# Patient Record
Sex: FEMALE | Race: WHITE | NOT HISPANIC OR LATINO | Employment: FULL TIME | URBAN - METROPOLITAN AREA
[De-identification: names, ages, dates, MRNs, and addresses within clinical notes are randomized per-mention and may not be internally consistent; named-entity substitution may affect disease eponyms.]

---

## 2017-01-09 ENCOUNTER — GENERIC CONVERSION - ENCOUNTER (OUTPATIENT)
Dept: OTHER | Facility: OTHER | Age: 32
End: 2017-01-09

## 2017-01-25 ENCOUNTER — GENERIC CONVERSION - ENCOUNTER (OUTPATIENT)
Dept: OTHER | Facility: OTHER | Age: 32
End: 2017-01-25

## 2017-01-25 ENCOUNTER — ALLSCRIPTS OFFICE VISIT (OUTPATIENT)
Dept: OTHER | Facility: OTHER | Age: 32
End: 2017-01-25

## 2017-01-26 ENCOUNTER — TRANSCRIBE ORDERS (OUTPATIENT)
Dept: ADMINISTRATIVE | Facility: HOSPITAL | Age: 32
End: 2017-01-26

## 2017-02-01 ENCOUNTER — GENERIC CONVERSION - ENCOUNTER (OUTPATIENT)
Dept: OTHER | Facility: OTHER | Age: 32
End: 2017-02-01

## 2017-02-01 ENCOUNTER — ALLSCRIPTS OFFICE VISIT (OUTPATIENT)
Dept: PERINATAL CARE | Facility: CLINIC | Age: 32
End: 2017-02-01
Payer: COMMERCIAL

## 2017-02-01 PROCEDURE — 76816 OB US FOLLOW-UP PER FETUS: CPT | Performed by: OBSTETRICS & GYNECOLOGY

## 2017-02-08 ENCOUNTER — LAB REQUISITION (OUTPATIENT)
Dept: LAB | Facility: HOSPITAL | Age: 32
End: 2017-02-08
Payer: COMMERCIAL

## 2017-02-08 ENCOUNTER — GENERIC CONVERSION - ENCOUNTER (OUTPATIENT)
Dept: OTHER | Facility: OTHER | Age: 32
End: 2017-02-08

## 2017-02-08 DIAGNOSIS — Z34.90 ENCOUNTER FOR SUPERVISION OF NORMAL PREGNANCY: ICD-10-CM

## 2017-02-08 PROCEDURE — 87653 STREP B DNA AMP PROBE: CPT | Performed by: OBSTETRICS & GYNECOLOGY

## 2017-02-10 LAB — GP B STREP DNA SPEC QL NAA+PROBE: NORMAL

## 2017-02-13 ENCOUNTER — GENERIC CONVERSION - ENCOUNTER (OUTPATIENT)
Dept: OTHER | Facility: OTHER | Age: 32
End: 2017-02-13

## 2017-02-22 ENCOUNTER — ALLSCRIPTS OFFICE VISIT (OUTPATIENT)
Dept: OTHER | Facility: OTHER | Age: 32
End: 2017-02-22

## 2017-02-27 ENCOUNTER — ALLSCRIPTS OFFICE VISIT (OUTPATIENT)
Dept: OTHER | Facility: OTHER | Age: 32
End: 2017-02-27

## 2017-02-28 ENCOUNTER — GENERIC CONVERSION - ENCOUNTER (OUTPATIENT)
Dept: OTHER | Facility: OTHER | Age: 32
End: 2017-02-28

## 2017-03-02 ENCOUNTER — ALLSCRIPTS OFFICE VISIT (OUTPATIENT)
Dept: OTHER | Facility: OTHER | Age: 32
End: 2017-03-02

## 2017-03-06 ENCOUNTER — ALLSCRIPTS OFFICE VISIT (OUTPATIENT)
Dept: OTHER | Facility: OTHER | Age: 32
End: 2017-03-06

## 2017-03-09 ENCOUNTER — ALLSCRIPTS OFFICE VISIT (OUTPATIENT)
Dept: OTHER | Facility: OTHER | Age: 32
End: 2017-03-09

## 2017-03-11 ENCOUNTER — HOSPITAL ENCOUNTER (INPATIENT)
Facility: HOSPITAL | Age: 32
LOS: 4 days | Discharge: HOME | End: 2017-03-15
Attending: OBSTETRICS & GYNECOLOGY | Admitting: OBSTETRICS & GYNECOLOGY
Payer: COMMERCIAL

## 2017-03-11 DIAGNOSIS — Z3A.40 40 WEEKS GESTATION OF PREGNANCY: Primary | ICD-10-CM

## 2017-03-11 LAB
ABO GROUP BLD: NORMAL
BLD GP AB SCN SERPL QL: NEGATIVE
ERYTHROCYTE [DISTWIDTH] IN BLOOD BY AUTOMATED COUNT: 13.4 % (ref 11.6–15.1)
HCT VFR BLD AUTO: 37.9 % (ref 34.8–46.1)
HGB BLD-MCNC: 12.9 G/DL (ref 11.5–15.4)
MCH RBC QN AUTO: 31.2 PG (ref 26.8–34.3)
MCHC RBC AUTO-ENTMCNC: 34 G/DL (ref 31.4–37.4)
MCV RBC AUTO: 92 FL (ref 82–98)
PLATELET # BLD AUTO: 290 THOUSANDS/UL (ref 149–390)
PMV BLD AUTO: 10.3 FL (ref 8.9–12.7)
RBC # BLD AUTO: 4.13 MILLION/UL (ref 3.81–5.12)
RH BLD: POSITIVE
WBC # BLD AUTO: 9.57 THOUSAND/UL (ref 4.31–10.16)

## 2017-03-11 PROCEDURE — 86900 BLOOD TYPING SEROLOGIC ABO: CPT | Performed by: OBSTETRICS & GYNECOLOGY

## 2017-03-11 PROCEDURE — 85027 COMPLETE CBC AUTOMATED: CPT | Performed by: OBSTETRICS & GYNECOLOGY

## 2017-03-11 PROCEDURE — 86901 BLOOD TYPING SEROLOGIC RH(D): CPT | Performed by: OBSTETRICS & GYNECOLOGY

## 2017-03-11 PROCEDURE — 86592 SYPHILIS TEST NON-TREP QUAL: CPT | Performed by: OBSTETRICS & GYNECOLOGY

## 2017-03-11 PROCEDURE — 86850 RBC ANTIBODY SCREEN: CPT | Performed by: OBSTETRICS & GYNECOLOGY

## 2017-03-11 RX ORDER — SODIUM CHLORIDE, SODIUM LACTATE, POTASSIUM CHLORIDE, CALCIUM CHLORIDE 600; 310; 30; 20 MG/100ML; MG/100ML; MG/100ML; MG/100ML
125 INJECTION, SOLUTION INTRAVENOUS CONTINUOUS
Status: DISCONTINUED | OUTPATIENT
Start: 2017-03-11 | End: 2017-03-12

## 2017-03-11 RX ADMIN — MISOPROSTOL 25 MCG: 100 TABLET ORAL at 23:13

## 2017-03-11 RX ADMIN — SODIUM CHLORIDE, SODIUM LACTATE, POTASSIUM CHLORIDE, AND CALCIUM CHLORIDE 125 ML/HR: .6; .31; .03; .02 INJECTION, SOLUTION INTRAVENOUS at 22:00

## 2017-03-12 ENCOUNTER — ANESTHESIA (INPATIENT)
Dept: LABOR AND DELIVERY | Facility: HOSPITAL | Age: 32
End: 2017-03-12
Payer: COMMERCIAL

## 2017-03-12 ENCOUNTER — ANESTHESIA EVENT (INPATIENT)
Dept: LABOR AND DELIVERY | Facility: HOSPITAL | Age: 32
End: 2017-03-12
Payer: COMMERCIAL

## 2017-03-12 PROBLEM — Z98.891 S/P CESAREAN SECTION: Status: ACTIVE | Noted: 2017-03-12

## 2017-03-12 LAB
BASE EXCESS BLDCOA CALC-SCNC: -3.9 MMOL/L (ref 3–11)
HCO3 BLDCOA-SCNC: 22.7 MMOL/L (ref 17.3–27.3)
O2 CT VFR BLDCOA CALC: 5.1 ML/DL
OXYHGB MFR BLDCOA: 21.1 %
PCO2 BLDCOA: 46.6 MM[HG] (ref 30–60)
PH BLDCOA: 7.31 [PH] (ref 7.23–7.43)
PO2 BLDCOA: 12.6 MM HG (ref 5–25)

## 2017-03-12 PROCEDURE — 82805 BLOOD GASES W/O2 SATURATION: CPT | Performed by: OBSTETRICS & GYNECOLOGY

## 2017-03-12 PROCEDURE — 94762 N-INVAS EAR/PLS OXIMTRY CONT: CPT | Performed by: SOCIAL WORKER

## 2017-03-12 RX ORDER — TERBUTALINE SULFATE 1 MG/ML
INJECTION, SOLUTION SUBCUTANEOUS
Status: COMPLETED
Start: 2017-03-12 | End: 2017-03-12

## 2017-03-12 RX ORDER — IBUPROFEN 600 MG/1
600 TABLET ORAL EVERY 6 HOURS PRN
Status: DISCONTINUED | OUTPATIENT
Start: 2017-03-12 | End: 2017-03-15 | Stop reason: HOSPADM

## 2017-03-12 RX ORDER — MIDAZOLAM HYDROCHLORIDE 1 MG/ML
INJECTION INTRAMUSCULAR; INTRAVENOUS AS NEEDED
Status: DISCONTINUED | OUTPATIENT
Start: 2017-03-12 | End: 2017-03-12 | Stop reason: SURG

## 2017-03-12 RX ORDER — BUPIVACAINE HYDROCHLORIDE 7.5 MG/ML
INJECTION, SOLUTION INTRASPINAL AS NEEDED
Status: DISCONTINUED | OUTPATIENT
Start: 2017-03-12 | End: 2017-03-12 | Stop reason: SURG

## 2017-03-12 RX ORDER — DIPHENHYDRAMINE HYDROCHLORIDE 50 MG/ML
25 INJECTION INTRAMUSCULAR; INTRAVENOUS EVERY 6 HOURS PRN
Status: DISPENSED | OUTPATIENT
Start: 2017-03-12 | End: 2017-03-13

## 2017-03-12 RX ORDER — KETOROLAC TROMETHAMINE 30 MG/ML
INJECTION, SOLUTION INTRAMUSCULAR; INTRAVENOUS AS NEEDED
Status: DISCONTINUED | OUTPATIENT
Start: 2017-03-12 | End: 2017-03-12 | Stop reason: SURG

## 2017-03-12 RX ORDER — DIAPER,BRIEF,INFANT-TODD,DISP
1 EACH MISCELLANEOUS DAILY PRN
Status: DISCONTINUED | OUTPATIENT
Start: 2017-03-12 | End: 2017-03-15 | Stop reason: HOSPADM

## 2017-03-12 RX ORDER — OXYTOCIN 10 [USP'U]/ML
INJECTION, SOLUTION INTRAMUSCULAR; INTRAVENOUS AS NEEDED
Status: DISCONTINUED | OUTPATIENT
Start: 2017-03-12 | End: 2017-03-12 | Stop reason: SURG

## 2017-03-12 RX ORDER — KETOROLAC TROMETHAMINE 30 MG/ML
30 INJECTION, SOLUTION INTRAMUSCULAR; INTRAVENOUS EVERY 6 HOURS
Status: COMPLETED | OUTPATIENT
Start: 2017-03-12 | End: 2017-03-13

## 2017-03-12 RX ORDER — NALBUPHINE HCL 10 MG/ML
5 AMPUL (ML) INJECTION
Status: ACTIVE | OUTPATIENT
Start: 2017-03-12 | End: 2017-03-13

## 2017-03-12 RX ORDER — METOCLOPRAMIDE HYDROCHLORIDE 5 MG/ML
5 INJECTION INTRAMUSCULAR; INTRAVENOUS EVERY 6 HOURS PRN
Status: ACTIVE | OUTPATIENT
Start: 2017-03-12 | End: 2017-03-13

## 2017-03-12 RX ORDER — SENNOSIDES 8.6 MG
1 TABLET ORAL
Status: DISCONTINUED | OUTPATIENT
Start: 2017-03-12 | End: 2017-03-15 | Stop reason: HOSPADM

## 2017-03-12 RX ORDER — OXYCODONE HYDROCHLORIDE AND ACETAMINOPHEN 5; 325 MG/1; MG/1
1 TABLET ORAL EVERY 4 HOURS PRN
Status: DISCONTINUED | OUTPATIENT
Start: 2017-03-13 | End: 2017-03-15 | Stop reason: HOSPADM

## 2017-03-12 RX ORDER — ONDANSETRON 2 MG/ML
4 INJECTION INTRAMUSCULAR; INTRAVENOUS EVERY 4 HOURS PRN
Status: DISPENSED | OUTPATIENT
Start: 2017-03-12 | End: 2017-03-13

## 2017-03-12 RX ORDER — MORPHINE SULFATE 0.5 MG/ML
INJECTION, SOLUTION EPIDURAL; INTRATHECAL; INTRAVENOUS AS NEEDED
Status: DISCONTINUED | OUTPATIENT
Start: 2017-03-12 | End: 2017-03-12 | Stop reason: SURG

## 2017-03-12 RX ORDER — TERBUTALINE SULFATE 1 MG/ML
0.25 INJECTION, SOLUTION SUBCUTANEOUS ONCE
Status: COMPLETED | OUTPATIENT
Start: 2017-03-12 | End: 2017-03-12

## 2017-03-12 RX ORDER — ONDANSETRON 2 MG/ML
INJECTION INTRAMUSCULAR; INTRAVENOUS AS NEEDED
Status: DISCONTINUED | OUTPATIENT
Start: 2017-03-12 | End: 2017-03-12 | Stop reason: SURG

## 2017-03-12 RX ORDER — OXYTOCIN/RINGER'S LACTATE 30/500 ML
1-30 PLASTIC BAG, INJECTION (ML) INTRAVENOUS ONCE
Status: COMPLETED | OUTPATIENT
Start: 2017-03-12 | End: 2017-03-12

## 2017-03-12 RX ORDER — ACETAMINOPHEN 325 MG/1
650 TABLET ORAL EVERY 6 HOURS PRN
Status: DISCONTINUED | OUTPATIENT
Start: 2017-03-12 | End: 2017-03-15 | Stop reason: HOSPADM

## 2017-03-12 RX ORDER — OXYCODONE HYDROCHLORIDE AND ACETAMINOPHEN 5; 325 MG/1; MG/1
2 TABLET ORAL EVERY 4 HOURS PRN
Status: DISCONTINUED | OUTPATIENT
Start: 2017-03-13 | End: 2017-03-15 | Stop reason: HOSPADM

## 2017-03-12 RX ORDER — ESMOLOL HYDROCHLORIDE 10 MG/ML
INJECTION INTRAVENOUS AS NEEDED
Status: DISCONTINUED | OUTPATIENT
Start: 2017-03-12 | End: 2017-03-12 | Stop reason: SURG

## 2017-03-12 RX ORDER — DOCUSATE SODIUM 100 MG/1
100 CAPSULE, LIQUID FILLED ORAL 2 TIMES DAILY
Status: DISCONTINUED | OUTPATIENT
Start: 2017-03-12 | End: 2017-03-15 | Stop reason: HOSPADM

## 2017-03-12 RX ORDER — SODIUM CHLORIDE, SODIUM LACTATE, POTASSIUM CHLORIDE, CALCIUM CHLORIDE 600; 310; 30; 20 MG/100ML; MG/100ML; MG/100ML; MG/100ML
INJECTION, SOLUTION INTRAVENOUS CONTINUOUS PRN
Status: DISCONTINUED | OUTPATIENT
Start: 2017-03-12 | End: 2017-03-12 | Stop reason: SURG

## 2017-03-12 RX ORDER — OXYCODONE HYDROCHLORIDE AND ACETAMINOPHEN 5; 325 MG/1; MG/1
1 TABLET ORAL EVERY 4 HOURS PRN
Status: DISCONTINUED | OUTPATIENT
Start: 2017-03-12 | End: 2017-03-15 | Stop reason: HOSPADM

## 2017-03-12 RX ORDER — EPHEDRINE SULFATE 50 MG/ML
INJECTION, SOLUTION INTRAVENOUS AS NEEDED
Status: DISCONTINUED | OUTPATIENT
Start: 2017-03-12 | End: 2017-03-12 | Stop reason: SURG

## 2017-03-12 RX ADMIN — ONDANSETRON 4 MG: 2 INJECTION INTRAMUSCULAR; INTRAVENOUS at 18:40

## 2017-03-12 RX ADMIN — MIDAZOLAM HYDROCHLORIDE 1 MG: 1 INJECTION, SOLUTION INTRAMUSCULAR; INTRAVENOUS at 12:35

## 2017-03-12 RX ADMIN — SODIUM CHLORIDE, SODIUM LACTATE, POTASSIUM CHLORIDE, AND CALCIUM CHLORIDE 125 ML/HR: .6; .31; .03; .02 INJECTION, SOLUTION INTRAVENOUS at 08:40

## 2017-03-12 RX ADMIN — EPHEDRINE SULFATE 10 MG: 50 INJECTION, SOLUTION INTRAMUSCULAR; INTRAVENOUS; SUBCUTANEOUS at 12:25

## 2017-03-12 RX ADMIN — KETOROLAC TROMETHAMINE 30 MG: 30 INJECTION, SOLUTION INTRAMUSCULAR at 12:25

## 2017-03-12 RX ADMIN — KETOROLAC TROMETHAMINE 30 MG: 30 INJECTION, SOLUTION INTRAMUSCULAR at 18:35

## 2017-03-12 RX ADMIN — TERBUTALINE SULFATE 0.25 MG: 1 INJECTION SUBCUTANEOUS at 05:51

## 2017-03-12 RX ADMIN — MIDAZOLAM HYDROCHLORIDE 1 MG: 1 INJECTION, SOLUTION INTRAMUSCULAR; INTRAVENOUS at 12:30

## 2017-03-12 RX ADMIN — ONDANSETRON 4 MG: 2 INJECTION INTRAMUSCULAR; INTRAVENOUS at 12:25

## 2017-03-12 RX ADMIN — TERBUTALINE SULFATE 0.25 MG: 1 INJECTION, SOLUTION SUBCUTANEOUS at 05:51

## 2017-03-12 RX ADMIN — MORPHINE SULFATE 0.25 MG: 0.5 INJECTION, SOLUTION EPIDURAL; INTRATHECAL; INTRAVENOUS at 11:58

## 2017-03-12 RX ADMIN — BUPIVACAINE HYDROCHLORIDE IN DEXTROSE 1.6 ML: 7.5 INJECTION, SOLUTION SUBARACHNOID at 11:58

## 2017-03-12 RX ADMIN — SODIUM CHLORIDE, SODIUM LACTATE, POTASSIUM CHLORIDE, AND CALCIUM CHLORIDE 125 ML/HR: .6; .31; .03; .02 INJECTION, SOLUTION INTRAVENOUS at 17:21

## 2017-03-12 RX ADMIN — SODIUM CHLORIDE, SODIUM LACTATE, POTASSIUM CHLORIDE, AND CALCIUM CHLORIDE 125 ML/HR: .6; .31; .03; .02 INJECTION, SOLUTION INTRAVENOUS at 04:27

## 2017-03-12 RX ADMIN — SODIUM CHLORIDE, SODIUM LACTATE, POTASSIUM CHLORIDE, AND CALCIUM CHLORIDE 125 ML/HR: .6; .31; .03; .02 INJECTION, SOLUTION INTRAVENOUS at 13:21

## 2017-03-12 RX ADMIN — Medication 2 MILLI-UNITS/MIN: at 04:26

## 2017-03-12 RX ADMIN — OXYTOCIN 20 UNITS: 10 INJECTION, SOLUTION INTRAMUSCULAR; INTRAVENOUS at 12:20

## 2017-03-12 RX ADMIN — ESMOLOL HYDROCHLORIDE 10 MG: 100 INJECTION, SOLUTION INTRAVENOUS at 12:08

## 2017-03-12 RX ADMIN — EPHEDRINE SULFATE 5 MG: 50 INJECTION, SOLUTION INTRAMUSCULAR; INTRAVENOUS; SUBCUTANEOUS at 11:59

## 2017-03-12 RX ADMIN — SODIUM CHLORIDE, SODIUM LACTATE, POTASSIUM CHLORIDE, AND CALCIUM CHLORIDE: .6; .31; .03; .02 INJECTION, SOLUTION INTRAVENOUS at 11:48

## 2017-03-12 RX ADMIN — CEFAZOLIN SODIUM 2000 MG: 2 SOLUTION INTRAVENOUS at 12:02

## 2017-03-12 RX ADMIN — DIPHENHYDRAMINE HYDROCHLORIDE 25 MG: 50 INJECTION, SOLUTION INTRAMUSCULAR; INTRAVENOUS at 18:43

## 2017-03-13 LAB
BASOPHILS # BLD AUTO: 0.01 THOUSANDS/ΜL (ref 0–0.1)
BASOPHILS NFR BLD AUTO: 0 % (ref 0–1)
EOSINOPHIL # BLD AUTO: 0.08 THOUSAND/ΜL (ref 0–0.61)
EOSINOPHIL NFR BLD AUTO: 1 % (ref 0–6)
ERYTHROCYTE [DISTWIDTH] IN BLOOD BY AUTOMATED COUNT: 13.9 % (ref 11.6–15.1)
HCT VFR BLD AUTO: 25.4 % (ref 34.8–46.1)
HGB BLD-MCNC: 8.2 G/DL (ref 11.5–15.4)
LYMPHOCYTES # BLD AUTO: 2.03 THOUSANDS/ΜL (ref 0.6–4.47)
LYMPHOCYTES NFR BLD AUTO: 17 % (ref 14–44)
MCH RBC QN AUTO: 30.4 PG (ref 26.8–34.3)
MCHC RBC AUTO-ENTMCNC: 32.3 G/DL (ref 31.4–37.4)
MCV RBC AUTO: 94 FL (ref 82–98)
MONOCYTES # BLD AUTO: 0.95 THOUSAND/ΜL (ref 0.17–1.22)
MONOCYTES NFR BLD AUTO: 8 % (ref 4–12)
NEUTROPHILS # BLD AUTO: 8.88 THOUSANDS/ΜL (ref 1.85–7.62)
NEUTS SEG NFR BLD AUTO: 74 % (ref 43–75)
NRBC BLD AUTO-RTO: 0 /100 WBCS
PLATELET # BLD AUTO: 190 THOUSANDS/UL (ref 149–390)
PMV BLD AUTO: 10.7 FL (ref 8.9–12.7)
RBC # BLD AUTO: 2.7 MILLION/UL (ref 3.81–5.12)
RPR SER QL: NORMAL
WBC # BLD AUTO: 11.98 THOUSAND/UL (ref 4.31–10.16)

## 2017-03-13 PROCEDURE — 85025 COMPLETE CBC W/AUTO DIFF WBC: CPT | Performed by: OBSTETRICS & GYNECOLOGY

## 2017-03-13 RX ADMIN — OXYCODONE HYDROCHLORIDE AND ACETAMINOPHEN 1 TABLET: 5; 325 TABLET ORAL at 21:30

## 2017-03-13 RX ADMIN — KETOROLAC TROMETHAMINE 30 MG: 30 INJECTION, SOLUTION INTRAMUSCULAR at 07:43

## 2017-03-13 RX ADMIN — DOCUSATE SODIUM 100 MG: 100 CAPSULE, LIQUID FILLED ORAL at 09:31

## 2017-03-13 RX ADMIN — DOCUSATE SODIUM 100 MG: 100 CAPSULE, LIQUID FILLED ORAL at 00:59

## 2017-03-13 RX ADMIN — OXYCODONE HYDROCHLORIDE AND ACETAMINOPHEN 1 TABLET: 5; 325 TABLET ORAL at 09:31

## 2017-03-13 RX ADMIN — SODIUM CHLORIDE, SODIUM LACTATE, POTASSIUM CHLORIDE, AND CALCIUM CHLORIDE 125 ML/HR: .6; .31; .03; .02 INJECTION, SOLUTION INTRAVENOUS at 01:09

## 2017-03-13 RX ADMIN — OXYCODONE HYDROCHLORIDE AND ACETAMINOPHEN 1 TABLET: 5; 325 TABLET ORAL at 14:52

## 2017-03-13 RX ADMIN — IBUPROFEN 600 MG: 600 TABLET, FILM COATED ORAL at 12:43

## 2017-03-13 RX ADMIN — DOCUSATE SODIUM 100 MG: 100 CAPSULE, LIQUID FILLED ORAL at 18:03

## 2017-03-13 RX ADMIN — KETOROLAC TROMETHAMINE 30 MG: 30 INJECTION, SOLUTION INTRAMUSCULAR at 00:59

## 2017-03-13 RX ADMIN — IBUPROFEN 600 MG: 600 TABLET, FILM COATED ORAL at 12:42

## 2017-03-14 RX ORDER — OXYCODONE HYDROCHLORIDE AND ACETAMINOPHEN 5; 325 MG/1; MG/1
1 TABLET ORAL EVERY 4 HOURS PRN
Qty: 30 TABLET | Refills: 0 | Status: SHIPPED | OUTPATIENT
Start: 2017-03-14 | End: 2018-03-27 | Stop reason: HOSPADM

## 2017-03-14 RX ORDER — SIMETHICONE 80 MG
80 TABLET,CHEWABLE ORAL EVERY 6 HOURS PRN
Status: DISCONTINUED | OUTPATIENT
Start: 2017-03-14 | End: 2017-03-15 | Stop reason: HOSPADM

## 2017-03-14 RX ADMIN — IBUPROFEN 600 MG: 600 TABLET, FILM COATED ORAL at 00:08

## 2017-03-14 RX ADMIN — OXYCODONE HYDROCHLORIDE AND ACETAMINOPHEN 1 TABLET: 5; 325 TABLET ORAL at 23:52

## 2017-03-14 RX ADMIN — IBUPROFEN 600 MG: 600 TABLET, FILM COATED ORAL at 20:31

## 2017-03-14 RX ADMIN — IBUPROFEN 600 MG: 600 TABLET, FILM COATED ORAL at 13:09

## 2017-03-14 RX ADMIN — DOCUSATE SODIUM 100 MG: 100 CAPSULE, LIQUID FILLED ORAL at 17:31

## 2017-03-14 RX ADMIN — SIMETHICONE CHEW TAB 80 MG 80 MG: 80 TABLET ORAL at 13:22

## 2017-03-14 RX ADMIN — OXYCODONE HYDROCHLORIDE AND ACETAMINOPHEN 1 TABLET: 5; 325 TABLET ORAL at 18:38

## 2017-03-14 RX ADMIN — OXYCODONE HYDROCHLORIDE AND ACETAMINOPHEN 1 TABLET: 5; 325 TABLET ORAL at 08:07

## 2017-03-14 RX ADMIN — DOCUSATE SODIUM 100 MG: 100 CAPSULE, LIQUID FILLED ORAL at 08:07

## 2017-03-14 RX ADMIN — OXYCODONE HYDROCHLORIDE AND ACETAMINOPHEN 1 TABLET: 5; 325 TABLET ORAL at 14:29

## 2017-03-15 VITALS
WEIGHT: 146 LBS | TEMPERATURE: 98.4 F | OXYGEN SATURATION: 99 % | DIASTOLIC BLOOD PRESSURE: 76 MMHG | HEART RATE: 72 BPM | HEIGHT: 61 IN | RESPIRATION RATE: 20 BRPM | BODY MASS INDEX: 27.56 KG/M2 | SYSTOLIC BLOOD PRESSURE: 114 MMHG

## 2017-03-15 RX ORDER — IBUPROFEN 600 MG/1
600 TABLET ORAL EVERY 6 HOURS PRN
Qty: 30 TABLET | Refills: 0 | Status: SHIPPED | OUTPATIENT
Start: 2017-03-15 | End: 2018-03-27 | Stop reason: HOSPADM

## 2017-03-15 RX ORDER — DOCUSATE SODIUM 100 MG/1
100 CAPSULE, LIQUID FILLED ORAL 2 TIMES DAILY
Qty: 60 CAPSULE | Refills: 0 | Status: SHIPPED | OUTPATIENT
Start: 2017-03-15 | End: 2018-03-27 | Stop reason: HOSPADM

## 2017-03-15 RX ORDER — OXYCODONE HYDROCHLORIDE AND ACETAMINOPHEN 5; 325 MG/1; MG/1
1 TABLET ORAL EVERY 4 HOURS PRN
Qty: 28 TABLET | Refills: 0 | Status: SHIPPED | OUTPATIENT
Start: 2017-03-15 | End: 2017-03-25

## 2017-03-15 RX ADMIN — OXYCODONE HYDROCHLORIDE AND ACETAMINOPHEN 1 TABLET: 5; 325 TABLET ORAL at 06:22

## 2017-03-15 RX ADMIN — OXYCODONE HYDROCHLORIDE AND ACETAMINOPHEN 1 TABLET: 5; 325 TABLET ORAL at 10:47

## 2017-03-15 RX ADMIN — OXYCODONE HYDROCHLORIDE AND ACETAMINOPHEN 1 TABLET: 5; 325 TABLET ORAL at 15:43

## 2017-03-15 RX ADMIN — DOCUSATE SODIUM 100 MG: 100 CAPSULE, LIQUID FILLED ORAL at 08:06

## 2017-03-22 ENCOUNTER — GENERIC CONVERSION - ENCOUNTER (OUTPATIENT)
Dept: OTHER | Facility: OTHER | Age: 32
End: 2017-03-22

## 2017-03-22 LAB — PLACENTA IN STORAGE: NORMAL

## 2017-03-24 ENCOUNTER — ALLSCRIPTS OFFICE VISIT (OUTPATIENT)
Dept: OTHER | Facility: OTHER | Age: 32
End: 2017-03-24

## 2017-03-29 ENCOUNTER — GENERIC CONVERSION - ENCOUNTER (OUTPATIENT)
Dept: OTHER | Facility: OTHER | Age: 32
End: 2017-03-29

## 2017-04-06 ENCOUNTER — ALLSCRIPTS OFFICE VISIT (OUTPATIENT)
Dept: OTHER | Facility: OTHER | Age: 32
End: 2017-04-06

## 2017-04-08 ENCOUNTER — TELEPHONE (OUTPATIENT)
Dept: LABOR AND DELIVERY | Facility: HOSPITAL | Age: 32
End: 2017-04-08

## 2017-04-14 ENCOUNTER — ALLSCRIPTS OFFICE VISIT (OUTPATIENT)
Dept: OTHER | Facility: OTHER | Age: 32
End: 2017-04-14

## 2017-04-17 ENCOUNTER — TELEPHONE (OUTPATIENT)
Dept: LABOR AND DELIVERY | Facility: HOSPITAL | Age: 32
End: 2017-04-17

## 2017-04-21 ENCOUNTER — ALLSCRIPTS OFFICE VISIT (OUTPATIENT)
Dept: OTHER | Facility: OTHER | Age: 32
End: 2017-04-21

## 2017-05-22 ENCOUNTER — ALLSCRIPTS OFFICE VISIT (OUTPATIENT)
Dept: OTHER | Facility: OTHER | Age: 32
End: 2017-05-22

## 2017-08-31 ENCOUNTER — GENERIC CONVERSION - ENCOUNTER (OUTPATIENT)
Dept: OTHER | Facility: OTHER | Age: 32
End: 2017-08-31

## 2017-10-12 ENCOUNTER — GENERIC CONVERSION - ENCOUNTER (OUTPATIENT)
Dept: OTHER | Facility: OTHER | Age: 32
End: 2017-10-12

## 2018-01-09 NOTE — RESULT NOTES
Verified Results  US OB < 14 WEEKS SINGLE OR FIRST GESTATION [id number] 08:45AM Jeannette Clock   TW Order Number: [id number]    - Patient Instructions: To schedule this appointment, please contact Central Scheduling at [phone]  Test Name Result Flag Reference   US OB < 14 WEEKS SINGLE OR FIRST GESTATION (Report)     FIRST TRIMESTER OBSTETRIC ULTRASOUND, COMPLETE     INDICATION: Encounter for supervision of normal pregnancy, unspecified, unspecified trimester  LMP is 5/30/2016  Dates and viability  COMPARISON: None     TECHNIQUE:  Transabdominal ultrasound of the pelvis was performed  Additional transvaginal imaging was then performed to better assess the gestation, myometrial/endometrial architecture and ovarian parenchymal detail  The study includes volumetric    sweeps and traditional still imaging technique  FINDINGS:     A single live intrauterine gestation is identified  Cardiac activity is present  Heart rate of 165 bpm      YOLK SAC: Present and normal in size and appearance  MEAN GESTATIONAL SAC SIZE: 33 mm = 8 weeks 2 days    MEAN CROWN RUMP LENGTH: 20 5 mm = 8 weeks 5 days    FETAL ANATOMY: Appropriate for gestational age  AMNIOTIC FLUID/SAC SHAPE: Within expected normal range  PLACENTA: The placenta is approriate for gestational age  There is no significant subchorionic fluid collection  UTERUS/ADNEXA:    5 0 x 3 6 x 3 8 cm exophytic right fundal fibroid  Probable right ovarian corpus luteum  Left ovary not visualized  The cervix remains closed  No free fluid present  IMPRESSION:     Single live intrauterine gestation at 8 weeks 5 days (range +/- 0 weeks 5 days) based on crown-rump length  ANNITA of 3/4/2017  Follow-up ultrasound at 20 weeks gestation may be considered for assessment of anatomy  5 cm exophytic right fundal fibroid  Nonvisualization left ovary         Workstation performed: [id number]     Signed by:   Steph Ballard DO 7/29/16       Signatures   Electronically signed by : IRIS Capps ; Jul 29 2016 12:24PM EST                       (Author)

## 2018-01-11 NOTE — RESULT NOTES
Message   Please call patient with reassuring results  Thank you  Verified Results  (Q) STEPWISE, PART 1 [id number] 08:35AM Unique Kaye     Test Name Result Flag Reference   INTERPRETATION SEE NOTE     This patient's risk does not exceed the first trimester  cut-off for Down syndrome or trisomy 18  The integrated  screen calculation is awaiting the second trimester sample  NT WAS USED IN THE RISK CALCULATIONS  Thank you for submitting this patient's Part 1 specimen  These first trimester values will be incorporated with the  second trimester values as part of the integrated testing  process  Please submit the Part 2 specimen between   09/10/2016-11/04/2016 (15 0 and 22 9 weeks gestation) with   09/10/2016-09/23/2016 (15 0 - 16 9 weeks gestation) being  optimal  When submitting Part 2, please include the  following Specimen # from Part 1:  [id number]   AGE RISK DOWN SYNDROME 1:410     RIC DOWN SYNDROME RISK <1:5000  <1:50   RISK FOR TRISOMY 18 <1:5000  <1:100   CALCULATED GESTATIONAL$AGE 13 4     Crown rump length (CRL) was used to calculate gestational  age  ANNITA, if provided, was not used for gestational age  dating  RADHA-A 1133 4 ng/mL     This test was performed using a kit that has not been  cleared or approved by the FDA  The analytical performance  characteristics of this test have been determined by [address]  This test  should not be used for diagnosis without confirmation by  other medically established means  RADHA-A MOM 0 96     HCG 62 9 IU/mL     HCG MOM 0 70     NT MOM 0 79     The maternal serum screening results indicate a lower risk  of trisomy 21 in this pregnancy  The nasal bone was assessed  via ultrasound and was present  The combined risk is  therefore likely to be less than the calculated risk  Other  findings later in the pregnancy may change the risk    Nasal bone assessment is best accomplished through a fetal  ultrasound performed between 11 weeks 0 days through 13  weeks 6 days  In assessing the risk for aneuploidy, the  evaluation of the maternal serum markers plus the nuchal  thickness measurement is calculated first  Any potential  change to the patient's risk for aneuploidy depends on the  nuchal thickness, crown-rump length, and the ethnic origin,  and therefore the values generated by the algorithm itself  will not change  Additional information about the assessment  of the fetal nasal bone may be found on the [facility] website at  http://www  fetalmedicine com/fmf/training-certification/cert  ilmugycn-dm-nkflq  nereyda/11-13-week-scan/assessment-of-the-nasal-bone/  Please note that the Sequential Integrated maternal serum  screen for Down syndrome risk assessment was designed by Dr Chriss Guadarrama ([address], et al J Med Screen 2003 v10 p56-104)  to provide a high detection rate and low false positive rate  when a cutoff of 1:50 is used to identify high risk  pregnancies during the first trimester  Use of any other  cutoff for determination of risk in the first trimester will  result in a higher false positive rate for the two-part  screen  All patients whose risk is lower than 1:50 should  have a second sample submitted to complete the screen  This is a screening test, not a diagnostic test      This risk assessment is based on demographic data provided  by the ordering physician  Please notify the laboratory  promptly if any data are incorrect  If you have questions concerning this report: For clinical consultation, call [phone]; For technical questions, call [phone] ext [phone]; For recalculations, fax to [phone]  For additional information, please refer to  http://education  [employer]*/faq/FAQ85  (This link is provided for informational/educational  purposes only )   REFERRING PHYSICIAN NAME [address] [place] [address] PHONE [phone]     REFERRING PHYSICIAN MAGALY [id number]     DATE OF BIRTH 1985     COLLECTION DATE 08/30/2016     MATERNAL WEIGHT 118 lbs     EST'D DATE OF DELIVERY 03/06/2017     ANNITA DETERMINED BY LMP     MOTHER'S ETHNIC ORIGIN      NUMBER OF FETUSES 1     INSULIN-DEPEND DIABETIC NO     REPEAT SPECIMEN NO     HX OF NEURAL TUBE DEFECTS NO     HISTORY OF DOWN SYNDROME NO     DONOR EGG NO     Donor Age: Egg Retrieval NOT GIVEN     ULTRASOUND DATE 08/26/2016     ULTRASONOGRAPHER'S NAME Cj Galvan     NTQR ULTRASONOGRAPHER ID# [id number]     NTQR LOCATION ID# NOT GIVEN     NTQR READING PHYS ID# [id number]     [facility] ULTRASONOGRAPHER ID# NOT GIVEN     CROWN RUMP LENGTH 68 mm     NUCHAL TRANSLUCENCY 1 2 mm     Nasal Bone PRESENT     IF TWINS NOT GIVEN     TWIN B CRL NOT GIVEN mm     TWIN B NT NOT GIVEN mm     Twin B Nasal Bone NOT GIVEN

## 2018-01-12 NOTE — MISCELLANEOUS
Message   Recorded as Task   Date: 10/10/2017 04:21 PM, Created By: Bryce Doyle   Task Name: Medical Complaint Callback   Assigned To: Alma Delia Morin   Regarding Patient: Shaji Merino, Status: Active   CommentLuwashekhar Mantel - 10 Oct 2017 4:21 PM     TASK CREATED  Caller: Self; Medical Complaint; [phone] (Home)  Started back on BC pills (ortho tri-cyclen lo) in June  This has happened in the past while she was on them, but for the past two months, the first and second day of her pack she gets so nauseated that she throws up  She takes the pill with breakfast and is fine  Starts to get nauseated throughout the day, eats some crackers and by 1-2:00 she throws up  Is there something she can do? Should she change the pill? Please call  [address] [place] [address] 10 Oct 2017 5:33 PM     TASK REPLIED TO: Previously Assigned To Alma Delia Morin  she can try taking the pill at night or we can try a different pill  Haider Fernández - 12 Oct 2017 9:38 AM     TASK EDITED  Lara is going to take her bc at night starting next month cause she only gets sick the first few days and then she is fine for the rest of the month          Signatures   Electronically signed by : Galilea Tiwari DO; Oct 12 7428 10:40AM EST                       (Author)

## 2018-01-13 VITALS
HEIGHT: 61 IN | SYSTOLIC BLOOD PRESSURE: 130 MMHG | WEIGHT: 133.25 LBS | BODY MASS INDEX: 25.16 KG/M2 | DIASTOLIC BLOOD PRESSURE: 86 MMHG

## 2018-01-13 VITALS
WEIGHT: 123 LBS | DIASTOLIC BLOOD PRESSURE: 78 MMHG | SYSTOLIC BLOOD PRESSURE: 122 MMHG | BODY MASS INDEX: 23.22 KG/M2 | HEART RATE: 68 BPM | HEIGHT: 61 IN

## 2018-01-13 VITALS
DIASTOLIC BLOOD PRESSURE: 66 MMHG | SYSTOLIC BLOOD PRESSURE: 110 MMHG | HEIGHT: 61 IN | BODY MASS INDEX: 23.13 KG/M2 | WEIGHT: 122.5 LBS | HEART RATE: 73 BPM

## 2018-01-13 VITALS
HEART RATE: 87 BPM | SYSTOLIC BLOOD PRESSURE: 136 MMHG | HEIGHT: 61 IN | DIASTOLIC BLOOD PRESSURE: 82 MMHG | WEIGHT: 148 LBS | BODY MASS INDEX: 27.94 KG/M2

## 2018-01-13 VITALS
HEART RATE: 91 BPM | SYSTOLIC BLOOD PRESSURE: 124 MMHG | HEIGHT: 61 IN | BODY MASS INDEX: 27.56 KG/M2 | DIASTOLIC BLOOD PRESSURE: 94 MMHG | WEIGHT: 146 LBS

## 2018-01-13 VITALS
WEIGHT: 142.6 LBS | BODY MASS INDEX: 26.92 KG/M2 | SYSTOLIC BLOOD PRESSURE: 124 MMHG | DIASTOLIC BLOOD PRESSURE: 78 MMHG | HEIGHT: 61 IN

## 2018-01-13 NOTE — PROGRESS NOTES
Assessment    1  Encounter for supervision of normal pregnancy (V22 1) (Z34 90)   2  Polyhydramnios (657 00) (O40 9XX0)   3  Uterine fibroids affecting pregnancy (654 10) (O34 10,D25 9)    Plan  Encounter for supervision of normal pregnancy, Polyhydramnios, Uterine fibroids  affecting pregnancy    · Fetal Non Stress Test - POC; Status:Complete;   Done: 77PSZ0509    Discussion/Summary  Discussion Summary:   FOR IOL Saturday night into Sunday  Chief Complaint  Chief Complaint Free Text Note Form: NST today      Active Problems    1  Encounter for supervision of normal pregnancy (V22 1) (Z34 90)   2  Polyhydramnios (657 00) (O40 9XX0)   3  Uterine fibroids affecting pregnancy (654 10) (O34 10,D25 9)    Allergies    1  Compazine SOLN    2  No Known Environmental Allergies   3  No Known Food Allergies    Vitals  Signs   Recorded: 24XZY2699 01:07PM   Heart Rate: 71  Systolic: 963  Diastolic: 68  Height: 5 ft 1 in  Weight: 147 lb   BMI Calculated: 27 78  BSA Calculated: 1 66  Pain Scale: 0    Procedure    G/P 1,0   LMP 05/30/16   EDC 03/06/17   EGA 40 3     /68   Duration of Test 25 minutes  Result: Reactive and > 15 bpm with movement  Baseline Rate 120 bpm    Deceleration: None  Fetal kick counts were reviewed with the patient  Current Meds   1  Breast Pump Miscellaneous; double electric; Therapy: 55KVN5001 to (Last Rx:34Xch3331) Ordered   2  L-Lysine 500 MG Oral Tablet; Therapy: (Recorded:60Olc4830) to Recorded   3  Original Prenatal Formula TABS;    Therapy: (Recorded:87Owl3683) to Recorded    Signatures   Electronically signed by : Gayathri Driver DO; Mar  9 0115  1:43PM EST                       (Author)

## 2018-01-14 VITALS
WEIGHT: 121 LBS | DIASTOLIC BLOOD PRESSURE: 72 MMHG | BODY MASS INDEX: 22.84 KG/M2 | HEART RATE: 71 BPM | SYSTOLIC BLOOD PRESSURE: 110 MMHG | HEIGHT: 61 IN

## 2018-01-14 VITALS
WEIGHT: 125 LBS | DIASTOLIC BLOOD PRESSURE: 72 MMHG | BODY MASS INDEX: 23.6 KG/M2 | SYSTOLIC BLOOD PRESSURE: 116 MMHG | HEART RATE: 68 BPM | HEIGHT: 61 IN

## 2018-01-14 VITALS
SYSTOLIC BLOOD PRESSURE: 112 MMHG | HEIGHT: 61 IN | BODY MASS INDEX: 27.38 KG/M2 | DIASTOLIC BLOOD PRESSURE: 66 MMHG | HEART RATE: 71 BPM | WEIGHT: 145 LBS

## 2018-01-15 NOTE — MISCELLANEOUS
To Whom It May Concern:        Gilma Nice is under my care for her pregnancy  Due to her late stage in pregnancy, I am putting her out of work after Friday, February 24th  Please contact my office with any questions  Sincerely,         Rosita SIMMONS  [address], DO      Electronically signed by:Rosita Ye DO  Feb 22 3108  9:13AM EST      Electronically signed Gina CAMPOS    Feb 22 2017 12:21PM EST Acknowledgement

## 2018-01-16 NOTE — PROGRESS NOTES
OCT 14 2016         RE: Emily Benitez                            To: [phone] [address] [place] [address] [address]   [facility]   MR#: [id number]                                  [facility]   : [address]   ENC: [id number]                             Maurizio Kmi, [facility]   (Exam #: [id number])                           Fax: [phone]      The LMP of this 27year old,  G1, P0-0-0-0 patient was MAY 27 2016, giving   her an ANNITA of MAR 6 2017 and a current gestational age of 24 weeks 4 days   by dates  A sonographic examination was performed on OCT 14 2016 using   real time equipment  The ultrasound examination was performed using   abdominal & vaginal techniques  The patient has a BMI of 23 6  Her blood   pressure today was 115/72  Earliest ultrasound found in her record: 16 8w2d 3/7/17 ANNITA      Problem list:   1   Pedunculated Fibroid 4 8 x 5 7 x 3 6 cm was noted on her 12 week scan      Cardiac motion was observed at 134 bpm       INDICATIONS      fetal anatomical survey   uterine fibroids      Exam Types      LEVEL II   Transvaginal      RESULTS      Fetus # 1 of 1   Vertex presentation   Fetal growth appeared normal   Placenta Location = Anterior   No placenta previa   Placenta Grade = 0      MEASUREMENTS (* Included In Average GA)      AC              14 4 cm        19 weeks 3 days* (48%)   BPD              4 7 cm        20 weeks 1 day * (65%)   HC              17 2 cm        19 weeks 5 days* (51%)   Femur            3 1 cm        19 weeks 6 days* (45%)      Nuchal Fold      3 7 mm      Humerus          2 9 cm        19 weeks 4 days  (52%)   Radius           2 4 cm        19 weeks 6 days   Ulna             2 9 cm        20 weeks 4 days   Tibia            2 7 cm        19 weeks 5 days  (46%)   Fibula           2 8 cm        19 weeks 3 days   Foot             3 0 cm        19 weeks 1 day      Cerebellum       2 1 cm        20 weeks 4 days   Biorbit          3 0 cm        19 weeks 4 days   CisternaMagna    3 6 mm      HC/AC           1 20   FL/AC           0 22   FL/BPD          0 67   EFW (Ac/Fl/Hc)   306 grams - 0 lbs 11 oz      THE AVERAGE GESTATIONAL AGE is 19 weeks 6 days +/- 10 days  AMNIOTIC FLUID         Largest Vertical Pocket = 4 8 cm   Amniotic Fluid: Normal      CERVICAL EVALUATION      SUPINE      Cervical Length: 3 80 cm      OTHER TEST RESULTS           Funneling?: No             Dynamic Changes?: No        Resp  To TFP?: No      ANATOMY      Head                                    Normal   Face/Neck                               Normal   Th  Cav  Normal   Heart                                   Normal   Abd  Cav  Normal   Stomach                                 Normal   Right Kidney                            Normal   Left Kidney                             Normal   Bladder                                 Normal   Abd  Wall                               Normal   Spine                                   Normal   Extrems                                 Normal   Genitalia                               Normal   Placenta                                Normal   Umbl  Cord                              Normal   Uterus                                  Normal   PCI                                     Normal      ANATOMY DETAILS      Visualized Appearing Sonographically Normal:   HEAD: (Calvarium, BPD Level, Cavum, Lateral Ventricles, Choroid Plexus,   Cerebellum, Cisterna Magna);    FACE/NECK: (Neck, Nuchal Fold, Profile,   Orbits, Nose/Lips, Palate, Face);    TH  CAV  : (Lungs, Diaphragm);       HEART: (Four Chamber View, Proximal Left Outflow, Proximal Right Outflow,   3 Vessel Trachea, Short Axis of Greater Vessels, Ductal Arch, Aortic Arch,   Interventricular Septum, Interatrial Septum, IVC, SVC, Cardiac Axis,   Cardiac Position);    ABD  CAV : (Liver, Gall Bladder); STOMACH, RIGHT   KIDNEY, LEFT KIDNEY, BLADDER, ABD  WALL, SPINE: (Cervical Spine, Thoracic   Spine, Lumbar Spine, Sacrum);    EXTREMS: (Lt Humerus, Rt Humerus, Lt   Forearm, Rt Forearm, Lt Hand, Rt Hand, Lt Femur, Rt Femur, Lt Low Leg, Rt   Low Leg, Lt Foot, Rt Foot);    GENITALIA, PLACENTA, UMBL  CORD, UTERUS, PCI      ANATOMY COMMENTS      No fetal structural abnormality or ultrasound marker for aneuploidy is   identified on the Level II ultrasound study today  Her survey of the   fetal anatomy is complete  Fetal growth and amniotic fluid volume appear   normal   Active movement of the fetal body & extremities was seen  There   is no suspicion of a subchorionic bleed  The placental cord insertion was   normal       Her right-sided pedunculated fibroid today measured 5 2 x 4 2 x 4 7 cm and   is similar in size to her prior scans and is nontender  FIBROIDS      Submucosal myometrium fibroid located in the right lateral fundus region,   measuring 5 3 x 4 2 x 4 8cm with a volume of 55 9cc  The appearance was   echolucent  ADNEXA      The left ovary appeared normal and measured 2 6 x 2 7 x 1 9 cm with a   volume of 7 0 cc  The right ovary appeared normal and measured 2 4 x 2 8 x   1 2 cm with a volume of 4 2 cc  IMPRESSION      Romano IUP   19 weeks and 6 days by this ultrasound  (ANNITA=MAR 4 2017)   Vertex presentation   Fetal growth appeared normal   Normal anatomy survey   Regular fetal heart rate of 134 bpm   Anterior placenta   No placenta previa      GENERAL COMMENT      The patient was informed of the findings and counseled about the   limitations of the exam in detecting all forms of fetal congenital   abnormalities  She denies any vaginal bleeding or uterine cramping/contractions  She does   feel fetal movement  Her sequential screen was normal with a less than   one in 5000 risk for Downs, trisomy 18 and neural tube defects        Follow up recommended:   No further follow-up is recommended at this time  The face to face time, in addition to time spent discussing ultrasound   results, was approximately  minutes, greater than 50% of which was spent   during counseling and coordination of care  BELLA Mcclure M D     Maternal-Fetal Medicine   Electronically signed 10/14/16 12:05           Electronically signed by:Rosita Gabriel DO  Oct 17 9361  9:58AM EST

## 2018-01-17 NOTE — PROGRESS NOTES
2017         RE: Mira Arambula                            To: [id number] [facility] [address]   [facility]   MR#: [id number]                                  [address]   : [address]   [address]:                                              [place], [facility]   (Exam #: [id number])                           Fax: [phone]      The LMP of this 27year old,  G1, P0-0-0-0 patient was MAY 27 2016, giving   her an ANNITA of MAR 6 2017 and a current gestational age of 27 weeks 2 days   by dates  A sonographic examination was performed on 2017 using real   time equipment  The ultrasound examination was performed using abdominal   technique  The patient has a BMI of 26 8  Her blood pressure today was   124/78  Earliest ultrasound found in her record: 16 8w2d 3/7/17 ANNITA         Cardiac motion was observed at 123 bpm       INDICATIONS      size less than dates      Exam Types      Level I      RESULTS      Fetus # 1 of 1   Vertex presentation   Fetal growth appeared normal   Placenta Location = Anterior   No placenta previa   Placenta Grade = II      MEASUREMENTS (* Included In Average GA)      AC              30 9 cm        35 weeks 0 days* (46%)   BPD              8 7 cm        35 weeks 1 day * (45%)   HC              31 0 cm        34 weeks 1 day * (19%)   Femur            6 4 cm        33 weeks 0 days* (18%)      Cerebellum       4 6 cm        35 weeks 6 days      HC/AC           1 00   FL/AC           0 21   FL/BPD          0 74   EFW (Ac/Fl/Hc)  2398 grams - 5 lbs 5 oz                 (28%)      THE AVERAGE GESTATIONAL AGE is 34 weeks 2 days +/- 21 days        AMNIOTIC FLUID      Q1: 5 4      Q2: 7 6      Q3: 3 6      Q4: 5 7   CLINT Total = 22 2 cm   Amniotic Fluid: Normal      ANATOMY DETAILS      Visualized Appearing Sonographically Normal:   HEAD: (Calvarium, BPD Level, Lateral Ventricles, Cerebellum);      FACE/NECK: (Nose/Lips);    TH  CAV : (Diaphragm); HEART: ([employer]);    STOMACH, RIGHT KIDNEY, LEFT KIDNEY, BLADDER, PLACENTA      Not Visualized:   HEAD: (Cavum); HEART: (Proximal Left Outflow, Proximal Right Outflow)      IMPRESSION      Romano IUP   34 weeks and 2 days by this ultrasound  (ANNITA=MAR 13 2017)   Vertex presentation   Fetal growth appeared normal   Regular fetal heart rate of 123 bpm   Anterior placenta   No placenta previa      GENERAL COMMENT      No fetal structural abnormality is identified on the Level I survey today  Fetal interval growth and amniotic fluid volume are normal  The   previously suspected uterine myoma is not imaged well today  The placenta   appears normal       RECOMMENDATION      Growth Ultrasound: As indicated      Blake Apgar, R D M S Laveda Pheasant, M D  Maternal-Fetal Medicine   Electronically signed 17 13:00           Electronically signed Alfonzo CAMPOS    2017 12:20PM EST Acknowledgement

## 2018-01-17 NOTE — RESULT NOTES
Message   Please call patient with reassuring results  Thank you  Verified Results  (Q) STEPWISE, PART 2 [id number] 08:53AM Brian Garcia     Test Name Result Flag Reference   INTERPRETATION SEE NOTE     SCREEN NEGATIVE FOR OPEN NTD, DOWN SYNDROME AND TRISOMY 18   NT WAS USED IN THE RISK CALCULATIONS  RISK FOR ONTD <1:5000     AGE RISK DOWN SYNDROME 1:540     RIC DOWN SYNDROME RISK <1:5000  <1:270   RISK FOR TRISOMY 18 <1:5000  <1:100   CALCULATED GESTATIONAL$AGE 16 4     Crown rump length (CRL) was used to calculate gestational  age  ANNITA, if provided, was not used for gestational age  dating  AFP, SERUM 40 0 ng/mL     AFP MOM 0 99     Reference Range:                                        <2 50                                        IDD        <1 90                                        TWINS      <4 00                                        TWINS IDD  <3 50                                        TRIPLETS   <4 50   HCG, SERUM 26 0 IU/mL     HCG MOM 0 70     ESTRIOL, FREE 0 80 ng/mL     ESTRIOL MOM 0 78     INHIBIN A, DIMERIC 143 pg/mL     INHIBIN A MOM 0 75     RADHA A 1133 4 ng/mL     This test was performed using a kit that has not been  cleared or approved by the FDA  The analytical performance  characteristics of this test have been determined by [facility]  This test  should not be used for diagnosis without confirmation by  other medically established means  RADHA-A MOM 0 97     NT MOM 0 79     The maternal serum screening results indicate a lower risk  of trisomy 21 in this pregnancy  The nasal bone was assessed  via ultrasound and was present  The combined risk is  therefore likely to be less than the calculated risk  Other  findings later in the pregnancy may change the risk  Nasal bone assessment is best accomplished through a fetal  ultrasound performed between 11 weeks 0 days through 13  weeks 6 days   In assessing the risk for aneuploidy, the  evaluation of the maternal serum markers plus the nuchal  thickness measurement is calculated first  Any potential  change to the patient's risk for aneuploidy depends on the  nuchal thickness, crown-rump length, and the ethnic origin,  and therefore the values generated by the algorithm itself  will not change  Additional information about the assessment  of the fetal nasal bone may be found on the [facility] website at  http://www  fetalmediRadarFind/f/training-certification/cert  rbfddnjy-rj-kmwzj  tence/11-13-week-scan/assessment-of-the-nasal-bone/  The Sequential Integrated Screen combines RADHA-A and hCG  with or without a nuchal translucency measurement in the  first trimester with AFP, unconjugated estriol, intact hCG  and Inhibin A in the second trimester  This provides a  useful screening test for detection of open neural tube  defects, Down syndrome and Trisomy 18  It should be noted  that normal results can never guarantee the birth of a  normal baby and that 2 to 3 percent of newborns have some  type of physical or mental defect, many of which are  undetectable through any known prenatal diagnostic  technique  This is a screening test, not a diagnostic test      This risk assessment is based on demographic data provided  by the ordering physician  Please notify the laboratory  promptly if any data are incorrect  If you have questions concerning this report: For clinical consultation, call [phone]; For technical questions, call [phone] ext [phone]; For recalculations, fax to [phone]     REFERRING PHYSICIAN NAME [facility] PHYSICIAN PHONE [phone]     REFERRING PHYSICIAN MAGALY [id number]     SPECIMEN # FROM PART 1 [id number]     DATE OF BIRTH 1985     COLLECTION DATE 09/20/2016     MATERNAL WEIGHT 119 lbs     ANNITA: 03/06/2017     NUCHAL TRANSLUCENCY 1 2 mm     Zoar Rump Length 68 mm     Ultrasound Date 08/26/2016     Nasal Bone PRESENT     MOTHER'S ETHNIC ORIGIN      INSULIN DEPEND DIABETIC NO     REPEAT SPECIMEN NO     NUMBER OF FETUSES 1     HX OF NEURAL TUBE DEFECTS NO     Twin B Nasal Bone NOT GIVEN     For additional information, please refer to  http://[employer]/faq/FAQ18  (This link is provided for more informational/educational  purposes only )

## 2018-01-17 NOTE — PROGRESS NOTES
AUG 26 2016         RE: Leatha Duffy                            To: [id number] [address] [place] [address] [address]   [facility]   MR#: [id number]                                  [facility]   : [address]   ENC: [id number]:JORJEUGIRIS ELIZONDO, [address] [facility]   (Exam #: [id number])                           Fax: [phone]      The LMP of this 31year old,  G1, P0-0-0-0 patient was MAY 27 2016, giving   her an ANNITA of MAR 6 2017 and a current gestational age of 16 weeks 4 days   by dates  A sonographic examination was performed on AUG 26 2016 using   real time equipment  The ultrasound examination was performed using   abdominal technique  The patient has a BMI of 22 3  Her blood pressure   today was 111/72  Earliest ultrasound found in her record: 16 8w2d 3/7/17 ANNITA      Thank you very much for referring this very nice patient for a    consultation and genetic screening ultrasound  This is the patient's   first pregnancy  The patient's medical history is essentially   unremarkable  Her gynecologic history is significant for a known fibroid   which has not caused her any pain  She denies the current use of tobacco,   cocaine, or drugs  She currently takes prenatal vitamins and L-lysine  She has an allergy to Compazine which causes anaphylaxis  Her family   medical history is significant for her mother having breast cancer at the   age of 52  A review of systems is otherwise unremarkable  On exam, the   patient appears well, in no acute distress, and her abdomen is nontender  Multiple longitudinal and transverse sections revealed a christy   intrauterine pregnancy with the fetus in variable presentation  The   placenta is anterior in implantation, grade 0 in appearance        Cardiac motion was observed at 153 bpm       INDICATIONS      first trimester genetic screening      Exam Types      Level I RESULTS      Fetus # 1 of 1   Variable presentation   Fetal growth appeared normal      MEASUREMENTS (* Included In Average GA)      CRL              6 8 cm        12 weeks 6 days*   Nuchal Trans    1 20 mm      THE AVERAGE GESTATIONAL AGE is 12 weeks 6 days +/- 7 days  FIBROIDS      Fibroid located in the right fundus region, measuring 4 8 x 5 7 x 3 6cm   with a volume of 51 5cc  Color doppler flow was not increased  The   appearance was heterogeneous  UTERINE ARTERIES                                  S/D   PI    RI    NOTCH       Left Uterine Artery        3 45  1 46  0 71       Right Uterine Artery       3 43  1 57  0 71      ANATOMY COMMENTS      Anatomic detail is limited at this gestational age  The yolk sac was not   noted  The fetal cranium appeared normal in shape and the nuchal   translucency was normal in size (1 2mm)  The nasal bone appears to be   present  The intracranial anatomy was unremarkable  Evaluation of the   spine revealed no obvious evidence for a neural tube defect  Anatomy of   the fetal thorax appeared within normal limits  The cardiac rhythm was   regular  Within the abdomen, stomach & bladder were visualized and the   abdominal wall appeared intact  A three vessel cord appears to be present  Active movement of the fetal body & extremities was seen  There is no   suspicion of a subchorionic bleed  The placental cord insertion was   normal    The uterine artery Dopplers are normall for this gestational   age  There is no suspicion of a uterine myoma  Free fluid is not seen in   the posterior cul-de-sac  ADNEXA      The left ovary was not visualized   The right ovary appeared normal and   measured 2 7 x 2 7 x 1 5 cm with a volume of 5 7 cc       AMNIOTIC FLUID         Largest Vertical Pocket = 3 3 cm   Amniotic Fluid: Normal      IMPRESSION      Romano IUP   12 weeks and 6 days by this ultrasound  (ANNITA=MAR 4 2017)   Variable presentation   Fetal growth appeared normal   Regular fetal heart rate of 153 bpm   Anterior placenta      GENERAL COMMENT      We discussed the options for genetic screening, including but not limited   to first trimester screening, second trimester screening, combined first   and second trimester screening, noninvasive prenatal testing (NIPT) for   patients at high risk and diagnostic screening through the use of CVS and   amniocentesis  We discussed the risks and benefits of each approach   including the sensitivities and false positive rates as well as the   difference between a screening test and a diagnostic test   At the   conclusion of our discussion the patient elected Stepwise Sequential   Screening to delineate her risk for fetal aneuploidy  She was given a   requisition to go to [facility] to have the first trimester bloodwood drawn  The first trimester portion of the screening results, encompassing age,   nuchal translucency, and biochemistry should be available within one week   of testing and will be reported from [facility]   The second stage of   sequential screening should be completed between the 15th and 21st week of   pregnancy (ideally between 16-18 weeks)  The patient has a fibroid which appears to be pedunculated  We discussed   that sometimes fibroids can grow during pregnancy, but most of them remain   the same and don't cause any significant issues with the pregnancy  The   most common complication includes maternal pain which can be treated   safely with non-steroidal anti-inflammatory medications up until around 32   weeks, if used sparingly  Usually, pedunculated fibroids do not cause any   significant complications with fetal development  Recommend an anatomy ultrasound be scheduled for 20 weeks gestation        Please note, in addition to the time spent discussing the results of the   ultrasound, I spent approximately 15 minutes of face-to-face time with the   patient, greater than 50% of which was spent in counseling and the   coordination of care for this patient  Thank you very much for allowing us to participate in the care of this   very nice patient  Should you have any questions, please do not hesitate   to contact our office  BELLA Benson M D  Electronically signed 08/26/16 08:39           Electronically signed Manuel CAMPOS    Aug 29 2016  9:55AM EST Acknowledgement

## 2018-01-22 VITALS
HEIGHT: 61 IN | SYSTOLIC BLOOD PRESSURE: 122 MMHG | DIASTOLIC BLOOD PRESSURE: 70 MMHG | HEART RATE: 91 BPM | OXYGEN SATURATION: 99 % | WEIGHT: 138.4 LBS | BODY MASS INDEX: 26.13 KG/M2

## 2018-01-22 VITALS
BODY MASS INDEX: 27.75 KG/M2 | SYSTOLIC BLOOD PRESSURE: 122 MMHG | DIASTOLIC BLOOD PRESSURE: 68 MMHG | WEIGHT: 147 LBS | HEART RATE: 71 BPM | HEIGHT: 61 IN

## 2018-01-22 VITALS
DIASTOLIC BLOOD PRESSURE: 78 MMHG | SYSTOLIC BLOOD PRESSURE: 124 MMHG | BODY MASS INDEX: 26.81 KG/M2 | HEIGHT: 61 IN | OXYGEN SATURATION: 98 % | HEART RATE: 98 BPM | WEIGHT: 142 LBS

## 2018-01-22 VITALS
HEART RATE: 72 BPM | BODY MASS INDEX: 27.19 KG/M2 | RESPIRATION RATE: 16 BRPM | DIASTOLIC BLOOD PRESSURE: 72 MMHG | WEIGHT: 144 LBS | HEIGHT: 61 IN | SYSTOLIC BLOOD PRESSURE: 112 MMHG

## 2018-01-22 VITALS
HEART RATE: 63 BPM | HEIGHT: 61 IN | SYSTOLIC BLOOD PRESSURE: 110 MMHG | DIASTOLIC BLOOD PRESSURE: 72 MMHG | BODY MASS INDEX: 22.66 KG/M2 | OXYGEN SATURATION: 99 % | WEIGHT: 120 LBS

## 2018-01-22 VITALS
SYSTOLIC BLOOD PRESSURE: 128 MMHG | BODY MASS INDEX: 27.19 KG/M2 | RESPIRATION RATE: 16 BRPM | WEIGHT: 144 LBS | HEIGHT: 61 IN | DIASTOLIC BLOOD PRESSURE: 62 MMHG | HEART RATE: 96 BPM

## 2018-01-22 VITALS
BODY MASS INDEX: 27.75 KG/M2 | DIASTOLIC BLOOD PRESSURE: 68 MMHG | WEIGHT: 147 LBS | SYSTOLIC BLOOD PRESSURE: 128 MMHG | HEART RATE: 102 BPM | HEIGHT: 61 IN

## 2018-03-07 NOTE — PROGRESS NOTES
[employer] Education 2nd Trimester:   Second Trimester Education provided: benefits of breastfeeding, importance of exclusive breastfeeding, early initiation of breastfeeding, exclusive breastfeeding for the first 6 months, non-pharmacologic pain relief methods for labor, rooming-in on 24 hour basis and Pregnancy Essentials Reference Guide given  Mother has not registered for prenatal class  Thought has not been given to selecting a pediatrician  The mother has not discussed personal preferences with her provider  Prenatal education provided by: mauri Date: 11/2/16        Signatures   Electronically signed by : Stann Hamman, DO; Nov 2 1474  9:28AM EST                       (Author)

## 2018-03-07 NOTE — PROGRESS NOTES
Education  [employer] Education 3rd Trimester: Third Trimester Education provided:   benefits of breastfeeding, importance of exclusive breastfeeding, early initiation of breastfeeding, exclusive breastfeeding for the first 6 months, frequent feedings for optimal milk production, feeding on demand/baby-led feedings, effective positioning and attachment, importance of breastfeeding after 6 months following introduction of other foods, non-pharmacologic pain relief methods for labor and importance of early skin-to-skin contact  rooming-in on 24 hour basis   The patient is planning on breastfeeding  The patient is planning on exclusively breastfeeding until the baby is 10months of age  Mother has registered for prenatal class  Thought has been given to selecting a pediatrician  The mother has discussed personal preferences with her provider     Prenatal education provided by: Amber Garcia   Electronically signed by : IRIS Hopkins ; Jan 25 2017  9:50AM EST                       (Author)

## 2018-03-27 DIAGNOSIS — Z30.41 ENCOUNTER FOR SURVEILLANCE OF CONTRACEPTIVE PILLS: Primary | ICD-10-CM

## 2018-03-27 RX ORDER — NORGESTIMATE AND ETHINYL ESTRADIOL
1 KIT DAILY
Qty: 84 TABLET | Refills: 1 | Status: SHIPPED | OUTPATIENT
Start: 2018-03-27 | End: 2018-09-06 | Stop reason: SDUPTHER

## 2018-03-27 RX ORDER — SERTRALINE HYDROCHLORIDE 25 MG/1
1 TABLET, FILM COATED ORAL DAILY
COMMUNITY
Start: 2017-04-06 | End: 2018-09-08 | Stop reason: SDUPTHER

## 2018-03-27 RX ORDER — NORGESTIMATE AND ETHINYL ESTRADIOL
1 KIT DAILY
COMMUNITY
Start: 2018-02-24 | End: 2018-03-27 | Stop reason: SDUPTHER

## 2018-04-25 ENCOUNTER — TELEPHONE (OUTPATIENT)
Dept: OBGYN CLINIC | Facility: CLINIC | Age: 33
End: 2018-04-25

## 2018-04-25 NOTE — TELEPHONE ENCOUNTER
Sent letter as requested electronically but probably needs  a signed copy  Please print and I will sign

## 2018-04-25 NOTE — TELEPHONE ENCOUNTER
Pt called, wants to know if you would call her in regards to cirtrillium that you prescribed her  Please callback at [phone]   Refused to speak to nurse at this time

## 2018-09-06 DIAGNOSIS — Z30.41 ENCOUNTER FOR SURVEILLANCE OF CONTRACEPTIVE PILLS: ICD-10-CM

## 2018-09-07 RX ORDER — NORGESTIMATE AND ETHINYL ESTRADIOL
1 KIT DAILY
Qty: 84 TABLET | Refills: 0 | Status: SHIPPED | OUTPATIENT
Start: 2018-09-07 | End: 2018-10-12 | Stop reason: SDUPTHER

## 2018-09-08 RX ORDER — SERTRALINE HYDROCHLORIDE 25 MG/1
25 TABLET, FILM COATED ORAL DAILY
Qty: 90 TABLET | Refills: 1 | Status: SHIPPED | OUTPATIENT
Start: 2018-09-08 | End: 2018-10-12 | Stop reason: SDUPTHER

## 2018-09-08 RX ORDER — SERTRALINE HYDROCHLORIDE 25 MG/1
TABLET, FILM COATED ORAL
Qty: 30 TABLET | Refills: 11 | OUTPATIENT
Start: 2018-09-08

## 2018-10-12 ENCOUNTER — ANNUAL EXAM (OUTPATIENT)
Dept: OBGYN CLINIC | Facility: CLINIC | Age: 33
End: 2018-10-12
Payer: COMMERCIAL

## 2018-10-12 VITALS
WEIGHT: 113 LBS | HEIGHT: 60 IN | DIASTOLIC BLOOD PRESSURE: 66 MMHG | BODY MASS INDEX: 22.19 KG/M2 | SYSTOLIC BLOOD PRESSURE: 118 MMHG

## 2018-10-12 DIAGNOSIS — Z01.419 WOMEN'S ANNUAL ROUTINE GYNECOLOGICAL EXAMINATION: Primary | ICD-10-CM

## 2018-10-12 DIAGNOSIS — Z30.41 ENCOUNTER FOR SURVEILLANCE OF CONTRACEPTIVE PILLS: ICD-10-CM

## 2018-10-12 DIAGNOSIS — R10.2 PELVIC PAIN: ICD-10-CM

## 2018-10-12 PROBLEM — Z3A.40 40 WEEKS GESTATION OF PREGNANCY: Status: RESOLVED | Noted: 2017-03-11 | Resolved: 2018-10-12

## 2018-10-12 PROBLEM — D25.9 UTERINE FIBROID: Status: ACTIVE | Noted: 2017-03-24

## 2018-10-12 PROBLEM — Z98.891 S/P CESAREAN SECTION: Status: RESOLVED | Noted: 2017-03-12 | Resolved: 2018-10-12

## 2018-10-12 PROCEDURE — 99395 PREV VISIT EST AGE 18-39: CPT | Performed by: OBSTETRICS & GYNECOLOGY

## 2018-10-12 RX ORDER — NORGESTIMATE AND ETHINYL ESTRADIOL 7DAYSX3 LO
1 KIT ORAL DAILY
Qty: 84 TABLET | Refills: 3 | Status: SHIPPED | OUTPATIENT
Start: 2018-10-12 | End: 2019-10-23 | Stop reason: SDUPTHER

## 2018-10-12 RX ORDER — SERTRALINE HYDROCHLORIDE 25 MG/1
25 TABLET, FILM COATED ORAL DAILY
Qty: 90 TABLET | Refills: 1 | Status: SHIPPED | OUTPATIENT
Start: 2018-10-12 | End: 2019-10-23 | Stop reason: SDUPTHER

## 2018-10-12 NOTE — PROGRESS NOTES
ASSESSMENT & PLAN: Enzo Nevarez is a 28 y o  Heather Hunger with normal gynecologic exam     1   Routine well woman exam done today  2    Pap and HPV:Pap with HPV was not done today  Current ASCCP Guidelines reviewed  3   The patient declined STD testing  No testing performed  Safe sex practices have been discussed  4  The patient is not sexually active  She agreeed to continue contraception and options have been discussed  5  The following were reviewed in today's visit: breast self exam, use and side effects of OCPs, family planning choices, exercise and healthy diet  6  Patient to return to office in 12 months for annual exam      All questions have been answered to her satisfaction  CC:  Annual Gynecologic Examination    HPI: Enzo Nevarez is a 28 y o  Heather Hunger who presents for annual gynecologic examination  She has the following concerns:  Divorce final  Needs refills on OCPs  Doing well on sertraline, considering weaning off  Would recommend every other day for a few weeks then can stop  H/o fibroid  Has occasional pain on right side, usually with/right before her period  Health Maintenance:    She exercises 1 days per week  She wears her seatbelt routinely  She does perform regular monthly self breast exams  She feels safe at home  Patients does follow a balanced diet  Past Medical History:   Diagnosis Date    Fibroid     Post partum depression 2017    Varicella     childhood       Past Surgical History:   Procedure Laterality Date     SECTION      [place]  DELIVERY ONLY N/A 3/12/2017    Procedure:  SECTION ();   Surgeon: [facility] DO JASSI;  [facility];  Service: Obstetrics       Past OB/Gyn History:  Period Cycle (Days): 28  Period Duration (Days): 5 to 6 days   Period Pattern: Regular  Menstrual Flow: Heavy, Moderate  Menstrual Control: Tampon, Panty liner  Dysmenorrhea: (!) Mild  Dysmenorrhea Symptoms: CrampingPatient's last menstrual period was 10/02/2018 (exact date)  History of sexually transmitted infection No  Patient is not currently sexually active: heterosexual  Birth control:oral contraceptives (estrogen/progesterone)    Last Pap  2016 :  no abnormalities;  HPV negative    Family History  Family History   Problem Relation Age of Onset    Breast cancer Mother     Breast cancer Maternal Aunt     No Known Problems Paternal Grandmother     No Known Problems Father     No Known Problems Sister     No Known Problems Son     No Known Problems Maternal Grandmother     No Known Problems Maternal Grandfather     No Known Problems Paternal Grandfather        Social History:  Social History     Social History    Marital status: /Civil Union     Spouse name: N/A    Number of children: N/A    Years of education: N/A     Occupational History    Not on file  Social History Main Topics    Smoking status: Never Smoker    Smokeless tobacco: Never Used    Alcohol use No    Drug use: No    Sexual activity: Yes     Partners: Male     Birth control/ protection: OCP     Other Topics Concern    Not on file     Social History Narrative    No narrative on file     Presently lives with mom and son  Patient is   Patient is currently employed  Allergies: Allergies   Allergen Reactions    Compazine [Prochlorperazine] Swelling     Tongue swelled, back spasm    Pollen Extract        Medications:    Current Outpatient Prescriptions:     sertraline (ZOLOFT) 25 mg tablet, Take 1 tablet (25 mg total) by mouth daily, Disp: 90 tablet, Rfl: 1    TRI-LO-VAISHALI 0 18/0 215/0 25 MG-25 MCG per tablet, TAKE 1 TABLET BY MOUTH DAILY, Disp: 84 tablet, Rfl: 0    Review of Systems:  Review of Systems   Constitutional: Negative for unexpected weight change  Respiratory: Negative for shortness of breath  Cardiovascular: Negative for chest pain     Gastrointestinal: Negative for abdominal pain, blood in stool, constipation, nausea and vomiting  Genitourinary: Positive for pelvic pain (pulling sensation with menses on right side)  Negative for difficulty urinating, dysuria, frequency, vaginal bleeding, vaginal discharge and vaginal pain  Neurological: Negative for headaches  Breasts: No changes    Physical Exam:  /66   Ht 5' 0 25" (1 53 m)   Wt 51 3 kg (113 lb)   LMP 10/02/2018 (Exact Date)   Breastfeeding? No   BMI 21 89 kg/m²    Physical Exam   Constitutional: She is oriented to person, place, and time  She appears well-developed and well-nourished  Genitourinary: Vagina normal and uterus normal  Pelvic exam was performed with patient supine  There is no rash, tenderness or lesion on the right labia  There is no rash, tenderness or lesion on the left labia  Vagina exhibits rugosity  No tenderness or bleeding in the vagina  No vaginal discharge found  Right adnexum does not display mass, does not display tenderness and does not display fullness  Left adnexum does not display mass, does not display tenderness and does not display fullness  Cervix does not exhibit motion tenderness, lesion, discharge or polyp  Uterus is mobile  Uterus is not tender  Neck: Normal range of motion  No thyromegaly present  Cardiovascular: Normal rate and regular rhythm  No murmur heard  Pulmonary/Chest: Effort normal and breath sounds normal  No respiratory distress  She has no wheezes  Right breast exhibits no inverted nipple, no mass, no nipple discharge, no skin change and no tenderness  Left breast exhibits no inverted nipple, no mass, no nipple discharge, no skin change and no tenderness  Abdominal: Soft  She exhibits no distension  There is no tenderness  There is no guarding  Neurological: She is alert and oriented to person, place, and time  Skin: Skin is dry  Psychiatric: She has a normal mood and affect   Her behavior is normal

## 2019-10-23 ENCOUNTER — ANNUAL EXAM (OUTPATIENT)
Dept: OBGYN CLINIC | Facility: CLINIC | Age: 34
End: 2019-10-23
Payer: COMMERCIAL

## 2019-10-23 VITALS
SYSTOLIC BLOOD PRESSURE: 102 MMHG | WEIGHT: 115 LBS | BODY MASS INDEX: 21.71 KG/M2 | DIASTOLIC BLOOD PRESSURE: 60 MMHG | HEIGHT: 61 IN

## 2019-10-23 DIAGNOSIS — Z01.419 WELL FEMALE EXAM WITH ROUTINE GYNECOLOGICAL EXAM: Primary | ICD-10-CM

## 2019-10-23 DIAGNOSIS — Z30.41 ENCOUNTER FOR SURVEILLANCE OF CONTRACEPTIVE PILLS: ICD-10-CM

## 2019-10-23 DIAGNOSIS — Z12.4 ENCOUNTER FOR SCREENING FOR MALIGNANT NEOPLASM OF CERVIX: ICD-10-CM

## 2019-10-23 DIAGNOSIS — Z91.89 AT HIGH RISK FOR BREAST CANCER: ICD-10-CM

## 2019-10-23 DIAGNOSIS — Z12.31 ENCOUNTER FOR SCREENING MAMMOGRAM FOR MALIGNANT NEOPLASM OF BREAST: ICD-10-CM

## 2019-10-23 PROCEDURE — 87624 HPV HI-RISK TYP POOLED RSLT: CPT | Performed by: OBSTETRICS & GYNECOLOGY

## 2019-10-23 PROCEDURE — S0612 ANNUAL GYNECOLOGICAL EXAMINA: HCPCS | Performed by: OBSTETRICS & GYNECOLOGY

## 2019-10-23 PROCEDURE — G0145 SCR C/V CYTO,THINLAYER,RESCR: HCPCS | Performed by: OBSTETRICS & GYNECOLOGY

## 2019-10-23 RX ORDER — NORGESTIMATE AND ETHINYL ESTRADIOL 7DAYSX3 LO
1 KIT ORAL DAILY
Qty: 84 TABLET | Refills: 3 | Status: SHIPPED | OUTPATIENT
Start: 2019-10-23 | End: 2020-09-29 | Stop reason: SDUPTHER

## 2019-10-23 RX ORDER — SERTRALINE HYDROCHLORIDE 25 MG/1
25 TABLET, FILM COATED ORAL DAILY
Qty: 90 TABLET | Refills: 3 | Status: SHIPPED | OUTPATIENT
Start: 2019-10-23

## 2019-10-23 NOTE — PROGRESS NOTES
ASSESSMENT & PLAN:     Diagnoses and all orders for this visit:    Well female exam with routine gynecological exam  Encounter for screening for malignant neoplasm of cervix  -     Liquid-based pap, screening    Encounter for surveillance of contraceptive pills  -     norgestimate-ethinyl estradiol (TRI-LO-VAISHALI) 0 18/0 215/0 25 MG-25 MCG per tablet; Take 1 tablet by mouth daily    Post partum depression  -     sertraline (ZOLOFT) 25 mg tablet; Take 1 tablet (25 mg total) by mouth daily  - Takes every other day    Encounter for screening mammogram for malignant neoplasm of breast  At high risk for breast cancer  -     Mammo screening bilateral w 3d & cad; Future  - Baseline  - Mom with breast CA at age 48      The following were reviewed in today's visit: ASCCP guidelines, breast self exam, use and side effects of OCPs, exercise and healthy diet  Patient to return to office in yearly for annual exam      All questions have been answered to her satisfaction  CC:  Annual Gynecologic Examination    HPI: Clinton Marin is a 27 y o  [place] who presents for annual gynecologic examination  She has the following concerns:  Two spots on bra at area of nipple  Can not extract discharge from breast  Almost looks a sweat stain  Health Maintenance:    She exercises 0 days per week  She wears her seatbelt routinely  She is practicing breast self awareness  She feels safe at home  Patient does try to follow a balanced diet  Past Medical History:   Diagnosis Date    Fibroid     Post partum depression 2017    Varicella     childhood       Past Surgical History:   Procedure Laterality Date     SECTION      [place]  DELIVERY ONLY N/A 3/12/2017    Procedure:  SECTION ();   Surgeon: Peyton Robert DO;  Location: [facility] ;  Service: Obstetrics       Past OB/Gyn History:  Period Cycle (Days): 28  Period Duration (Days): 5-6  Period Pattern: Regular  Menstrual Flow: Moderate, Light  Dysmenorrhea: (!) Mild  Dysmenorrhea Symptoms: CrampingPatient's last menstrual period was 10/02/2019  History of sexually transmitted infection: No  Patient is currently sexually active     Birth control:oral contraceptives (estrogen/progesterone)    Last Pap  2016 :  NILM;  HPV negative    Family History  Family History   Problem Relation Age of Onset    Breast cancer Mother     Breast cancer Maternal Aunt     No Known Problems Paternal Grandmother     No Known Problems Father     No Known Problems Sister     No Known Problems Son     No Known Problems Maternal Grandmother     No Known Problems Maternal Grandfather     No Known Problems Paternal Grandfather        Social History:  Social History     Socioeconomic History    Marital status: /Civil Union     Spouse name: Not on file    Number of children: Not on file    Years of education: Not on file    Highest education level: Not on file   Occupational History    Not on file   Social Needs    Financial resource strain: Not on file    Food insecurity:     Worry: Not on file     Inability: Not on file    Transportation needs:     Medical: Not on file     Non-medical: Not on file   Tobacco Use    Smoking status: Never Smoker    Smokeless tobacco: Never Used   Substance and Sexual Activity    Alcohol use: No    Drug use: No    Sexual activity: Yes     Partners: Male     Birth control/protection: OCP   Lifestyle    Physical activity:     Days per week: Not on file     Minutes per session: Not on file    Stress: Not on file   Relationships    Social connections:     Talks on phone: Not on file     Gets together: Not on file     Attends Mu-ism service: Not on file     Active member of club or organization: Not on file     Attends meetings of clubs or organizations: Not on file     Relationship status: Not on file    Intimate partner violence:     Fear of current or ex partner: Not on file     Emotionally abused: Not on file Physically abused: Not on file     Forced sexual activity: Not on file   Other Topics Concern    Not on file   Social History Narrative    Not on file     Presently lives with parents  Patient is , and dating someone  Patient is currently employed  Allergies: Allergies   Allergen Reactions    Compazine [Prochlorperazine] Swelling     Tongue swelled, back spasm    Pollen Extract        Medications:    Current Outpatient Medications:     norgestimate-ethinyl estradiol (TRI-LO-VAISHALI) 0 18/0 215/0 25 MG-25 MCG per tablet, Take 1 tablet by mouth daily, Disp: 84 tablet, Rfl: 3    sertraline (ZOLOFT) 25 mg tablet, Take 1 tablet (25 mg total) by mouth daily, Disp: 90 tablet, Rfl: 3    Review of Systems:  Review of Systems   Constitutional: Negative for unexpected weight change  Respiratory: Negative for shortness of breath  Cardiovascular: Negative for chest pain  Gastrointestinal: Negative for abdominal distention, abdominal pain, blood in stool, constipation, nausea and vomiting  Genitourinary: Negative for difficulty urinating, dysuria, frequency, menstrual problem, pelvic pain, urgency, vaginal bleeding and vaginal discharge  Neurological: Negative for headaches  Physical Exam:  /60   Ht 5' 1" (1 549 m)   Wt 52 2 kg (115 lb)   LMP 10/02/2019   BMI 21 73 kg/m²    Physical Exam   Constitutional: She is oriented to person, place, and time  Vital signs are normal  She appears well-developed and well-nourished  Genitourinary: Vagina normal and uterus normal  Pelvic exam was performed with patient supine  There is no rash, tenderness or lesion on the right labia  There is no rash, tenderness or lesion on the left labia  Vagina exhibits rugosity  No erythema, tenderness or bleeding in the vagina  No vaginal discharge found  Right adnexum does not display mass, does not display tenderness and does not display fullness   Left adnexum does not display mass, does not display tenderness and does not display fullness  Cervix exhibits pinkness  Cervix does not exhibit motion tenderness, lesion or polyp  Uterus is not enlarged, tender or irregular (is regular)  Genitourinary Comments: No bladder tenderness     HENT:   Head: Normocephalic  Neck: No thyromegaly present  Cardiovascular: Normal rate, regular rhythm and normal heart sounds  Pulmonary/Chest: Effort normal and breath sounds normal  No respiratory distress  Right breast exhibits no inverted nipple, no mass, no nipple discharge, no skin change and no tenderness  Left breast exhibits no inverted nipple, no mass, no nipple discharge, no skin change and no tenderness  Abdominal: Soft  She exhibits no distension  There is no tenderness  Neurological: She is alert and oriented to person, place, and time  Psychiatric: She has a normal mood and affect  Her behavior is normal    Vitals reviewed

## 2019-10-25 LAB
HPV HR 12 DNA CVX QL NAA+PROBE: NEGATIVE
HPV16 DNA CVX QL NAA+PROBE: NEGATIVE
HPV18 DNA CVX QL NAA+PROBE: NEGATIVE

## 2019-10-30 LAB
LAB AP GYN PRIMARY INTERPRETATION: NORMAL
Lab: NORMAL

## 2019-11-08 ENCOUNTER — HOSPITAL ENCOUNTER (OUTPATIENT)
Dept: MAMMOGRAPHY | Facility: MEDICAL CENTER | Age: 34
Discharge: HOME | End: 2019-11-08
Attending: OBSTETRICS & GYNECOLOGY
Payer: COMMERCIAL

## 2019-11-08 VITALS — WEIGHT: 115 LBS | HEIGHT: 61 IN | BODY MASS INDEX: 21.71 KG/M2

## 2019-11-08 DIAGNOSIS — Z91.89 AT HIGH RISK FOR BREAST CANCER: ICD-10-CM

## 2019-11-08 DIAGNOSIS — Z12.31 ENCOUNTER FOR SCREENING MAMMOGRAM FOR MALIGNANT NEOPLASM OF BREAST: ICD-10-CM

## 2019-11-08 DIAGNOSIS — Z80.3 FAMILY HISTORY OF BREAST CANCER: ICD-10-CM

## 2019-11-08 PROCEDURE — 77067 SCR MAMMO BI INCL CAD: CPT

## 2019-11-08 PROCEDURE — 77063 BREAST TOMOSYNTHESIS BI: CPT

## 2019-11-11 NOTE — RESULT ENCOUNTER NOTE
Negative mammo - repeat 1 year  Please let her know, mammo is negative, recommend repeat yearly d/t family history

## 2020-09-29 DIAGNOSIS — Z30.41 ENCOUNTER FOR SURVEILLANCE OF CONTRACEPTIVE PILLS: ICD-10-CM

## 2020-09-29 RX ORDER — NORGESTIMATE AND ETHINYL ESTRADIOL 7DAYSX3 LO
1 KIT ORAL DAILY
Qty: 84 TABLET | Refills: 1 | Status: SHIPPED | OUTPATIENT
Start: 2020-09-29 | End: 2021-03-09 | Stop reason: SDUPTHER

## 2020-11-13 ENCOUNTER — OFFICE VISIT (OUTPATIENT)
Dept: URGENT CARE | Facility: CLINIC | Age: 35
End: 2020-11-13

## 2020-11-13 VITALS
TEMPERATURE: 97.6 F | OXYGEN SATURATION: 100 % | RESPIRATION RATE: 16 BRPM | HEIGHT: 61 IN | BODY MASS INDEX: 22.66 KG/M2 | WEIGHT: 120 LBS | HEART RATE: 76 BPM

## 2020-11-13 DIAGNOSIS — Z20.822 CLOSE EXPOSURE TO 2019 NOVEL CORONAVIRUS: Primary | ICD-10-CM

## 2020-11-13 PROCEDURE — 99213 OFFICE O/P EST LOW 20 MIN: CPT | Performed by: FAMILY MEDICINE

## 2020-11-13 PROCEDURE — U0003 INFECTIOUS AGENT DETECTION BY NUCLEIC ACID (DNA OR RNA); SEVERE ACUTE RESPIRATORY SYNDROME CORONAVIRUS 2 (SARS-COV-2) (CORONAVIRUS DISEASE [COVID-19]), AMPLIFIED PROBE TECHNIQUE, MAKING USE OF HIGH THROUGHPUT TECHNOLOGIES AS DESCRIBED BY CMS-2020-01-R: HCPCS | Performed by: FAMILY MEDICINE

## 2020-11-15 LAB — SARS-COV-2 RNA SPEC QL NAA+PROBE: NOT DETECTED

## 2021-01-09 ENCOUNTER — IMMUNIZATIONS (OUTPATIENT)
Dept: FAMILY MEDICINE CLINIC | Facility: HOSPITAL | Age: 36
End: 2021-01-09

## 2021-01-09 DIAGNOSIS — Z23 ENCOUNTER FOR IMMUNIZATION: ICD-10-CM

## 2021-01-09 PROCEDURE — 0001A SARS-COV-2 / COVID-19 MRNA VACCINE (PFIZER-BIONTECH) 30 MCG: CPT

## 2021-01-09 PROCEDURE — 91300 SARS-COV-2 / COVID-19 MRNA VACCINE (PFIZER-BIONTECH) 30 MCG: CPT

## 2021-01-30 ENCOUNTER — IMMUNIZATIONS (OUTPATIENT)
Dept: FAMILY MEDICINE CLINIC | Facility: HOSPITAL | Age: 36
End: 2021-01-30

## 2021-01-30 DIAGNOSIS — Z23 ENCOUNTER FOR IMMUNIZATION: Primary | ICD-10-CM

## 2021-01-30 PROCEDURE — 0002A SARS-COV-2 / COVID-19 MRNA VACCINE (PFIZER-BIONTECH) 30 MCG: CPT

## 2021-01-30 PROCEDURE — 91300 SARS-COV-2 / COVID-19 MRNA VACCINE (PFIZER-BIONTECH) 30 MCG: CPT

## 2021-03-09 ENCOUNTER — OFFICE VISIT (OUTPATIENT)
Dept: OBGYN CLINIC | Facility: CLINIC | Age: 36
End: 2021-03-09

## 2021-03-09 VITALS
WEIGHT: 116 LBS | HEART RATE: 73 BPM | DIASTOLIC BLOOD PRESSURE: 76 MMHG | HEIGHT: 61 IN | SYSTOLIC BLOOD PRESSURE: 122 MMHG | BODY MASS INDEX: 21.9 KG/M2

## 2021-03-09 DIAGNOSIS — Z30.41 ENCOUNTER FOR SURVEILLANCE OF CONTRACEPTIVE PILLS: ICD-10-CM

## 2021-03-09 DIAGNOSIS — Z01.419 ENCOUNTER FOR GYNECOLOGICAL EXAMINATION WITHOUT ABNORMAL FINDING: Primary | ICD-10-CM

## 2021-03-09 DIAGNOSIS — Z80.3 FAMILY HISTORY OF BREAST CANCER IN MOTHER: ICD-10-CM

## 2021-03-09 PROCEDURE — 99395 PREV VISIT EST AGE 18-39: CPT | Performed by: NURSE PRACTITIONER

## 2021-03-09 RX ORDER — NORGESTIMATE AND ETHINYL ESTRADIOL 7DAYSX3 LO
1 KIT ORAL DAILY
Qty: 84 TABLET | Refills: 3 | Status: SHIPPED | OUTPATIENT
Start: 2021-03-09 | End: 2022-02-14

## 2021-03-09 NOTE — PROGRESS NOTES
ASSESSMENT & PLAN: Ttaa Warner is a 38 y o  Marnie Gunter with normal gynecologic exam     1   Routine well woman exam done today  2  Pap and HPV:  The patient's last pap and hpv was  10/23/2019  It was normal     Pap and cotesting was not done today  Current ASCCP Guidelines reviewed  Next due 10/2024  3  The following were reviewed in today's visit: breast self exam, STD testing, use and side effects of OCPs, adequate intake of calcium and vitamin D, exercise and healthy diet  No contraindications to OCP's  Reviewed risks, side effects, benefits, efficacy, back up method, ACHES, reviewed breast cancer risk with OCP's  Prescription renewed for 1 year  4  Gardisil vaccine in women up to age 39  information was provided  5  Family hx of breast cancer in her mother and maternal aunt,  Bilateral screening 3D MMG ordered, pt currently without insurance, to be done through  program for free MMG  Reviewed genetic counseling, testing for variants- pt declines at this time due to lack of insurance  CC:  Annual Gynecologic Examination    HPI: Tata Warner is a 38 y o  Marnie Gunetr who presents for annual gynecologic examination  She is a new pt to us  Her last Pap was 10/23/2019 and was negative with negative high-risk HPV  She denies abnormal pap history  She is on OCPs, is happy with method and desires to continue  History of  uterine fibroids identified during her pregnancy  Pt declines f/u US  She reports she will sometimes get a discomfort on the Rt side of her C/S incision scar, only gets when she is lying down and is turning, states does not happen very often      her last mammogram was 11/08/2019 and was BI-RADS 1, dense breasts,  Has elevated risk  On tyrer- Cuzick lifetime  Family history of breast cancer in her mother, maternal aunt  BRCA1 testing negative in her mother  Her mother had a double mastectomy at age 46, her mother currently is 72years old         Health Maintenance:    She wears her seatbelt routinely  She does perform regular monthly self breast exams  She feels safe at home  Past Medical History:   Diagnosis Date    Fibroid     Post partum depression 2017    Varicella     childhood       Past Surgical History:   Procedure Laterality Date     SECTION      UT  DELIVERY ONLY N/A 3/12/2017    Procedure:  SECTION (); Surgeon: Syeda Carrington DO;  Location: [facility];  Service: Obstetrics       Past OB/Gyn History:  OB History        1    Para   1    Term   1            AB   0    Living   1       SAB        TAB        Ectopic        Multiple   0    Live Births   1               Pt does not have menstrual issues  Monthly, lasts x 6 days, regular flow   History of sexually transmitted infection: No   History of abnormal pap smears: No      Patient is currently sexually active  heterosexual   The current method of family planning is OCP (estrogen/progesterone)      Family History   Problem Relation Age of Onset    Breast cancer Mother     BRCA1 Negative Mother     Breast cancer Maternal Aunt     No Known Problems Paternal Grandmother     No Known Problems Father     No Known Problems Sister     No Known Problems Son     No Known Problems Maternal Grandmother     No Known Problems Maternal Grandfather     No Known Problems Paternal Grandfather     No Known Problems Paternal Aunt        Social History:  Social History     Socioeconomic History    Marital status: /Civil Union     Spouse name: Not on file    Number of children: Not on file    Years of education: Not on file    Highest education level: Not on file   Occupational History    Not on file   Social Needs    Financial resource strain: Not on file    Food insecurity     Worry: Not on file     Inability: Not on file    Transportation needs     Medical: Not on file     Non-medical: Not on file   Tobacco Use    Smoking status: Never Smoker    Smokeless tobacco: Never Used   Substance and Sexual Activity    Alcohol use: No    Drug use: No    Sexual activity: Yes     Partners: Male     Birth control/protection: OCP   Lifestyle    Physical activity     Days per week: Not on file     Minutes per session: Not on file    Stress: Not on file   Relationships    Social connections     Talks on phone: Not on file     Gets together: Not on file     Attends Religion service: Not on file     Active member of club or organization: Not on file     Attends meetings of clubs or organizations: Not on file     Relationship status: Not on file    Intimate partner violence     Fear of current or ex partner: Not on file     Emotionally abused: Not on file     Physically abused: Not on file     Forced sexual activity: Not on file   Other Topics Concern    Not on file   Social History Narrative    Not on file     Presently lives with family  Patient is   Patient is currently employed     Allergies   Allergen Reactions    Compazine [Prochlorperazine] Swelling     Tongue swelled, back spasm    Pollen Extract          Current Outpatient Medications:     norgestimate-ethinyl estradiol (Tri-Lo-Iwona) 0 18/0 215/0 25 MG-25 MCG per tablet, Take 1 tablet by mouth daily, Disp: 84 tablet, Rfl: 1    sertraline (ZOLOFT) 25 mg tablet, Take 1 tablet (25 mg total) by mouth daily, Disp: 90 tablet, Rfl: 3      Review of Systems  Constitutional :no fever, feels well, no tiredness, no recent weight gain or loss  ENT: no ear ache, no loss of hearing, no nosebleeds or nasal discharge, no sore throat or hoarseness  Cardiovascular: no complaints of slow or fast heart beat, no chest pain, no palpitations, no leg claudication or lower extremity edema    Respiratory: no complaints of shortness of shortness of breath, no GARLAND  Breasts:no complaints of breast pain, breast lump, or nipple discharge  Gastrointestinal: no complaints of abdominal pain, constipation, nausea, vomiting, or diarrhea or bloody stools  Genitourinary : no complaints of dysuria, incontinence, pelvic pain, no dysmenorrhea, vaginal discharge or abnormal vaginal bleeding and as noted in HPI  Musculoskeletal: no complaints of arthralgia, no myalgia, no joint swelling or stiffness, no limb pain or swelling  Integumentary: no complaints of skin rash or lesion, itching or dry skin  Neurological: no complaints of headache, no confusion, no numbness or tingling, no dizziness or fainting    Objective      There were no vitals taken for this visit  General:   appears stated age, cooperative, alert normal mood and affect   Neck: normal, supple,trachea midline, no masses   Heart: regular rate and rhythm, S1, S2 normal, no murmur, click, rub or gallop   Lungs: clear to auscultation bilaterally   Breasts: normal appearance, no masses or tenderness, Inspection negative, No nipple retraction or dimpling, No nipple discharge or bleeding, No axillary or supraclavicular adenopathy, Normal to palpation without dominant masses, Taught monthly breast self examination   Abdomen: soft, non-tender, without masses or organomegaly   Vulva: normal female genitalia, Bartholin's, Urethra, Hominy normal   Vagina: normal vagina, no discharge, exudate, lesion, or erythema   Urethra: normal   Cervix: Normal, no discharge  Nontender  Uterus: normal size, contour, position, consistency, mobility, non-tender and retroverted   Adnexa: normal adnexa and no mass, fullness, tenderness   Lymphatic palpation of lymph nodes in neck, axilla, groin and/or other locations: no lymphadenopathy or masses noted   Skin normal skin turgor and no rashes     Psychiatric orientation to person, place, and time: normal  mood and affect: normal

## 2021-03-09 NOTE — PATIENT INSTRUCTIONS
HPV (Human Papillomavirus) Vaccine for Adults   WHAT YOU NEED TO KNOW:   What is the human papillomavirus (HPV) vaccine? The HPV vaccine is an injection given to females and males to protect against human papillomavirus infection  HPV is most commonly spread through sexual activity  It can also be spread from a mother to her baby during delivery  The HPV vaccine is most effective if given before sexual activity begins  This allows your body to build almost complete protection against HPV before you have contact with the virus  The HPV vaccine is still effective after sexual activity has begun  How is the vaccine given? The HPV vaccine can be given with other vaccines  The vaccine is given in 2 or 3 doses through age 32:  · The first dose  is given at any time  · The second dose  is given 1 to 2 months after the first dose  · The third dose  is given 6 months after the first dose  What else do I need to know about how the vaccine is given? You may need 1 more dose of the HPV vaccine if any of the following is true:  · You only received 1 dose of the HPV vaccine before 13years of age  · You received 2 doses of the HPV vaccine less than 5 months apart before 13years of age  What are reasons I should not get the HPV vaccine, or should wait to get it? · You had a severe allergic reaction to a dose of the vaccine  · You are pregnant  Your healthcare provider will tell you when you can get the vaccine  · You are sick or have a fever  You may need to wait to get the vaccine until symptoms go away  What are the risks of the HPV vaccine? You may have pain, redness, or swelling where the shot was given  You may have a fever or headache  You may have an allergic reaction to the vaccine  This can be life-threatening  Call your local emergency number (911 in the [address] [place] [address],[address]) if:   · You have signs of a severe allergic reaction, such as trouble breathing, hives, or wheezing        When should I seek immediate care? · You have a high fever or behavior changes that concern you  When should I call my doctor? · You have questions or concerns about the HPV vaccine  CARE AGREEMENT:   You have the right to help plan your care  Learn about your health condition and how it may be treated  Discuss treatment options with your healthcare providers to decide what care you want to receive  You always have the right to refuse treatment  The above information is an  only  It is not intended as medical advice for individual conditions or treatments  Talk to your doctor, nurse or pharmacist before following any medical regimen to see if it is safe and effective for you  © Copyright [address] Information is for End User's use only and may not be sold, redistributed or otherwise used for commercial purposes   All illustrations and images included in CareNotes® are the copyrighted property of A D A M , Inc  or [address] [place] [address]

## 2021-04-09 ENCOUNTER — HOSPITAL ENCOUNTER (OUTPATIENT)
Dept: MAMMOGRAPHY | Facility: CLINIC | Age: 36
Discharge: HOME | End: 2021-04-09

## 2021-04-09 VITALS — BODY MASS INDEX: 21.9 KG/M2 | HEIGHT: 61 IN | WEIGHT: 116 LBS

## 2021-04-09 DIAGNOSIS — Z12.31 ENCOUNTER FOR SCREENING MAMMOGRAM FOR MALIGNANT NEOPLASM OF BREAST: ICD-10-CM

## 2021-04-09 DIAGNOSIS — Z80.3 FAMILY HISTORY OF BREAST CANCER IN MOTHER: ICD-10-CM

## 2021-04-09 PROCEDURE — 77067 SCR MAMMO BI INCL CAD: CPT

## 2021-04-09 PROCEDURE — 77063 BREAST TOMOSYNTHESIS BI: CPT

## 2022-02-14 ENCOUNTER — TELEPHONE (OUTPATIENT)
Dept: OBGYN CLINIC | Facility: CLINIC | Age: 37
End: 2022-02-14

## 2022-02-14 DIAGNOSIS — Z30.41 ENCOUNTER FOR SURVEILLANCE OF CONTRACEPTIVE PILLS: ICD-10-CM

## 2022-02-14 RX ORDER — NORGESTIMATE AND ETHINYL ESTRADIOL
KIT
Qty: 28 TABLET | Refills: 0 | Status: SHIPPED | OUTPATIENT
Start: 2022-02-14 | End: 2022-02-17 | Stop reason: SDUPTHER

## 2022-02-14 NOTE — TELEPHONE ENCOUNTER
Called and informed pt that her yearly will be due 3/9/22  Pt stated she will call back to schedule appt  For yearly

## 2022-02-17 NOTE — TELEPHONE ENCOUNTER
Patient called and stated she will no longer be able to come to us for care  Her insurance changed and she has to stay in [place]  She is requesting 1 more month of pills to get her to her new OBGYN appointment the end of March

## 2022-05-08 ENCOUNTER — NURSE TRIAGE (OUTPATIENT)
Dept: OTHER | Facility: OTHER | Age: 37
End: 2022-05-08

## 2022-05-08 DIAGNOSIS — Z11.59 SPECIAL SCREENING EXAMINATION FOR VIRAL DISEASE: Primary | ICD-10-CM

## 2022-05-08 PROCEDURE — 87636 SARSCOV2 & INF A&B AMP PRB: CPT | Performed by: FAMILY MEDICINE

## 2022-05-08 NOTE — TELEPHONE ENCOUNTER
Regarding: Covid symptomatic-congestion, cough, headache  ----- Message from Gordon Hensley sent at 5/8/2022  2:40 PM EDT -----  "I have some congestion, cough, and a headache    My mom and son tested positive "

## 2022-05-08 NOTE — TELEPHONE ENCOUNTER
Reason for Disposition   [1] COVID-19 infection suspected by caller or triager AND [2] mild symptoms (cough, fever, or others) AND [3] negative COVID-19 rapid test    Answer Assessment - Initial Assessment Questions  1  COVID-19 DIAGNOSIS: "Who made your COVID-19 diagnosis?" "Was it confirmed by a positive lab test or self-test?" If not diagnosed by a doctor (or NP/PA), ask "Are there lots of cases (community spread) where you live?" Note: See [facility] health department website, if unsure  Symptoms   2  COVID-19 EXPOSURE: "Was there any known exposure to COVID before the symptoms began?" [facility] Definition of close contact: within 6 feet (2 meters) for a total of 15 minutes or more over a 24-hour period  Mom and son both positive  3  ONSET: "When did the COVID-19 symptoms start?"      5/2    8  BETTER-SAME-WORSE: "Are you getting better, staying the same or getting worse compared to yesterday?"  If getting worse, ask, "In what way?"      better  10  VACCINE: "Have you had the COVID-19 vaccine?" If Yes, ask: "Which one, how many shots, when did you get it?"        yes  11  BOOSTER: "Have you received your COVID-19 booster?" If Yes, ask: "Which one and when did you get it?"       yes  13   OTHER SYMPTOMS: "Do you have any other symptoms?"  (e g , chills, fatigue, headache, loss of smell or taste, muscle pain, sore throat)       Congestion, cough, headache    Protocols used: CORONAVIRUS (COVID-19) DIAGNOSED OR SUSPECTED-[facility]

## 2022-05-09 LAB
FLUAV RNA RESP QL NAA+PROBE: NEGATIVE
FLUBV RNA RESP QL NAA+PROBE: NEGATIVE
SARS-COV-2 RNA RESP QL NAA+PROBE: NEGATIVE

## (undated) DEVICE — SKIN MARKER DUAL TIP WITH RULER CAP, FLEXIBLE RULER AND LABELS: Brand: DEVON

## (undated) DEVICE — GLOVE SRG BIOGEL ECLIPSE 7

## (undated) DEVICE — GLOVE INDICATOR PI UNDERGLOVE SZ 7.5 BLUE

## (undated) DEVICE — SUT VICRYL 0 CT-1 36 IN J946H

## (undated) DEVICE — SUT MONOCRYL 4-0 PS-2 27 IN Y426H

## (undated) DEVICE — SUT MONOCRYL 0 CTX 36 IN Y398H

## (undated) DEVICE — SUT PLAIN 3-0 CT-1 27 IN 842H

## (undated) DEVICE — Device

## (undated) DEVICE — CHLORAPREP HI-LITE 26ML ORANGE

## (undated) DEVICE — ADHESIVE SKN CLSR HISTOACRYL FLEX 0.5ML LF

## (undated) DEVICE — PACK C-SECTION PBDS